# Patient Record
Sex: FEMALE | Race: WHITE | NOT HISPANIC OR LATINO | Employment: OTHER | URBAN - METROPOLITAN AREA
[De-identification: names, ages, dates, MRNs, and addresses within clinical notes are randomized per-mention and may not be internally consistent; named-entity substitution may affect disease eponyms.]

---

## 2023-03-13 ENCOUNTER — TELEPHONE (OUTPATIENT)
Dept: PRIMARY CARE | Facility: CLINIC | Age: 83
End: 2023-03-13
Payer: MEDICARE

## 2023-03-13 NOTE — TELEPHONE ENCOUNTER
Rosie from [employer] is calling to inform you that pt was sent to [facility] for severe lower back pain.

## 2023-03-14 NOTE — TELEPHONE ENCOUNTER
[facility] coordinator, left a msg stating that the pt will be sent home with care orders and wanted to know if you will follow.

## 2023-03-16 ENCOUNTER — TELEPHONE (OUTPATIENT)
Dept: PRIMARY CARE | Facility: CLINIC | Age: 83
End: 2023-03-16
Payer: MEDICARE

## 2023-03-16 NOTE — TELEPHONE ENCOUNTER
Care Giver Ethel from [facility] called regarding wanting her pt. To start Physical Therapy today and is requesting order please. Ethel states stop care with [facility] as of today.Please Advise.

## 2023-03-17 ENCOUNTER — TELEPHONE (OUTPATIENT)
Dept: PRIMARY CARE | Facility: CLINIC | Age: 83
End: 2023-03-17
Payer: MEDICARE

## 2023-03-27 PROBLEM — M46.1 SACROILIITIS (CMS-HCC): Status: ACTIVE | Noted: 2023-03-27

## 2023-03-27 PROBLEM — M81.0 AGE RELATED OSTEOPOROSIS: Status: ACTIVE | Noted: 2023-03-27

## 2023-03-27 PROBLEM — J38.3 VOCAL CORD DISEASE: Status: ACTIVE | Noted: 2023-03-27

## 2023-03-27 PROBLEM — N18.2 CHRONIC KIDNEY DISEASE (CKD), STAGE II (MILD): Status: ACTIVE | Noted: 2023-03-27

## 2023-03-27 PROBLEM — M54.50 LOWER BACK PAIN: Status: ACTIVE | Noted: 2023-03-27

## 2023-03-27 PROBLEM — R41.3 MEMORY LOSS: Status: ACTIVE | Noted: 2023-03-27

## 2023-03-27 PROBLEM — M48.061 LUMBAR SPINAL STENOSIS: Status: ACTIVE | Noted: 2023-03-27

## 2023-03-27 PROBLEM — M35.00 SJOGRENS SYNDROME (MULTI): Status: ACTIVE | Noted: 2023-03-27

## 2023-03-27 PROBLEM — K63.5 COLON POLYPS: Status: ACTIVE | Noted: 2023-03-27

## 2023-03-27 PROBLEM — I87.8 VENOUS STASIS: Status: ACTIVE | Noted: 2023-03-27

## 2023-03-27 PROBLEM — M54.2 NECK PAIN: Status: ACTIVE | Noted: 2023-03-27

## 2023-03-27 PROBLEM — H90.3 ASYMMETRICAL SENSORINEURAL HEARING LOSS: Status: ACTIVE | Noted: 2023-03-27

## 2023-03-27 PROBLEM — I10 TYPE 2 MYOCARDIAL INFARCTION DUE TO HYPERTENSION (MULTI): Status: ACTIVE | Noted: 2023-03-27

## 2023-03-27 PROBLEM — Z20.822 SUSPECTED COVID-19 VIRUS INFECTION: Status: ACTIVE | Noted: 2023-03-27

## 2023-03-27 PROBLEM — F32.A DEPRESSION: Status: ACTIVE | Noted: 2023-03-27

## 2023-03-27 PROBLEM — S00.06XA: Status: ACTIVE | Noted: 2023-03-27

## 2023-03-27 PROBLEM — I21.9 MYOCARDIAL INFARCTION (MULTI): Status: ACTIVE | Noted: 2023-03-27

## 2023-03-27 PROBLEM — F33.9 RECURRENT MAJOR DEPRESSIVE DISORDER (CMS-HCC): Status: ACTIVE | Noted: 2023-03-27

## 2023-03-27 PROBLEM — I73.9 CLAUDICATION, INTERMITTENT (CMS-HCC): Status: ACTIVE | Noted: 2023-03-27

## 2023-03-27 PROBLEM — R26.89 IMBALANCE: Status: ACTIVE | Noted: 2023-03-27

## 2023-03-27 PROBLEM — F03.90 DEMENTIA WITHOUT BEHAVIORAL DISTURBANCE, PSYCHOTIC DISTURBANCE, MOOD DISTURBANCE, OR ANXIETY (MULTI): Status: ACTIVE | Noted: 2023-03-27

## 2023-03-27 PROBLEM — H93.11 TINNITUS, RIGHT EAR: Status: ACTIVE | Noted: 2023-03-27

## 2023-03-27 PROBLEM — M25.561 RIGHT KNEE PAIN: Status: ACTIVE | Noted: 2023-03-27

## 2023-03-27 PROBLEM — M25.512 BILATERAL SHOULDER PAIN: Status: ACTIVE | Noted: 2023-03-27

## 2023-03-27 PROBLEM — D33.3 RIGHT ACOUSTIC NEUROMA (MULTI): Status: ACTIVE | Noted: 2023-03-27

## 2023-03-27 PROBLEM — G47.00 INSOMNIA: Status: ACTIVE | Noted: 2023-03-27

## 2023-03-27 PROBLEM — M62.81 MUSCLE WEAKNESS OF LOWER EXTREMITY: Status: ACTIVE | Noted: 2023-03-27

## 2023-03-27 PROBLEM — R05.9 COUGH: Status: ACTIVE | Noted: 2023-03-27

## 2023-03-27 PROBLEM — U07.1 DISEASE DUE TO SEVERE ACUTE RESPIRATORY SYNDROME CORONAVIRUS 2 (SARS-COV-2): Status: ACTIVE | Noted: 2023-03-27

## 2023-03-27 PROBLEM — N18.31 STAGE 3A CHRONIC KIDNEY DISEASE (MULTI): Status: ACTIVE | Noted: 2023-03-27

## 2023-03-27 PROBLEM — I21.A1 TYPE 2 MYOCARDIAL INFARCTION DUE TO HYPERTENSION (MULTI): Status: ACTIVE | Noted: 2023-03-27

## 2023-03-27 PROBLEM — M25.511 BILATERAL SHOULDER PAIN: Status: ACTIVE | Noted: 2023-03-27

## 2023-03-27 PROBLEM — M19.019 LOCALIZED, PRIMARY OSTEOARTHRITIS OF SHOULDER REGION: Status: ACTIVE | Noted: 2023-03-27

## 2023-03-27 PROBLEM — E03.9 HYPOTHYROIDISM: Status: ACTIVE | Noted: 2023-03-27

## 2023-03-27 PROBLEM — M79.89 SWOLLEN ARM: Status: ACTIVE | Noted: 2023-03-27

## 2023-03-27 PROBLEM — E55.9 VITAMIN D DEFICIENCY: Status: ACTIVE | Noted: 2023-03-27

## 2023-03-27 PROBLEM — M19.011 LOCALIZED OSTEOARTHRITIS OF RIGHT SHOULDER: Status: ACTIVE | Noted: 2023-03-27

## 2023-03-27 PROBLEM — R94.31 ABNORMAL EKG: Status: ACTIVE | Noted: 2023-03-27

## 2023-03-27 PROBLEM — N18.1 CHRONIC KIDNEY DISEASE (CKD), STAGE I: Status: ACTIVE | Noted: 2023-03-27

## 2023-03-27 PROBLEM — R51.9 HEADACHE: Status: ACTIVE | Noted: 2023-03-27

## 2023-03-27 PROBLEM — K92.1 HEMATOCHEZIA: Status: ACTIVE | Noted: 2023-03-27

## 2023-03-27 PROBLEM — I10 BENIGN ESSENTIAL HYPERTENSION: Status: ACTIVE | Noted: 2023-03-27

## 2023-03-27 PROBLEM — M54.50 LUMBAGO: Status: ACTIVE | Noted: 2023-03-27

## 2023-03-27 PROBLEM — M85.80 OSTEOPENIA: Status: ACTIVE | Noted: 2023-03-27

## 2023-03-27 PROBLEM — G47.33 OBSTRUCTIVE SLEEP APNEA: Status: ACTIVE | Noted: 2023-03-27

## 2023-03-27 PROBLEM — R41.3 MILD MEMORY DISTURBANCE: Status: ACTIVE | Noted: 2023-03-27

## 2023-03-27 PROBLEM — S05.02XA LEFT CORNEAL ABRASION: Status: ACTIVE | Noted: 2023-03-27
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
## 2023-03-27 RX ORDER — CALCIUM CARBONATE 200(500)MG
1 TABLET,CHEWABLE ORAL DAILY
COMMUNITY
Start: 2021-04-22

## 2023-03-27 RX ORDER — DENOSUMAB 60 MG/ML
60 INJECTION SUBCUTANEOUS
COMMUNITY
Start: 2020-07-01

## 2023-03-27 RX ORDER — MEMANTINE HYDROCHLORIDE 10 MG/1
1 TABLET ORAL 2 TIMES DAILY
COMMUNITY
Start: 2022-06-28

## 2023-03-27 RX ORDER — ASPIRIN 325 MG
1 TABLET, DELAYED RELEASE (ENTERIC COATED) ORAL
COMMUNITY
Start: 2022-04-29

## 2023-03-27 RX ORDER — CHOLECALCIFEROL (VITAMIN D3) 25 MCG
TABLET ORAL
COMMUNITY
End: 2023-09-28 | Stop reason: SDUPTHER

## 2023-03-27 RX ORDER — ACETAMINOPHEN 500 MG
1-2 TABLET ORAL EVERY 8 HOURS PRN
COMMUNITY

## 2023-03-27 RX ORDER — ASPIRIN 81 MG/1
1 TABLET ORAL DAILY
COMMUNITY
Start: 2022-10-10 | End: 2023-09-28 | Stop reason: ALTCHOICE

## 2023-03-27 RX ORDER — HYDROXYCHLOROQUINE SULFATE 200 MG/1
1 TABLET, FILM COATED ORAL 2 TIMES DAILY
COMMUNITY
Start: 2013-01-25

## 2023-03-27 RX ORDER — PAROXETINE 30 MG/1
TABLET, FILM COATED ORAL
COMMUNITY
Start: 2022-08-02 | End: 2023-12-21 | Stop reason: ALTCHOICE

## 2023-03-27 RX ORDER — ATORVASTATIN CALCIUM 40 MG/1
1 TABLET, FILM COATED ORAL NIGHTLY
COMMUNITY
Start: 2022-10-06

## 2023-03-27 RX ORDER — CYCLOSPORINE 0.5 MG/ML
1 EMULSION OPHTHALMIC NIGHTLY
COMMUNITY
Start: 2012-12-18 | End: 2024-01-30 | Stop reason: WASHOUT

## 2023-03-27 RX ORDER — PAROXETINE HYDROCHLORIDE 20 MG/1
1 TABLET, FILM COATED ORAL DAILY
COMMUNITY
Start: 2016-04-05 | End: 2023-09-28

## 2023-03-27 RX ORDER — AMLODIPINE AND BENAZEPRIL HYDROCHLORIDE 5; 10 MG/1; MG/1
1 CAPSULE ORAL DAILY
COMMUNITY
Start: 2013-01-17 | End: 2023-09-28 | Stop reason: ALTCHOICE

## 2023-03-27 RX ORDER — LEVOTHYROXINE SODIUM 150 UG/1
1 TABLET ORAL DAILY
COMMUNITY
Start: 2021-02-24

## 2023-03-27 RX ORDER — PREDNISONE 5 MG/1
1 TABLET ORAL DAILY
COMMUNITY
Start: 2019-04-11

## 2023-03-27 NOTE — TELEPHONE ENCOUNTER
I spoke with Isabela and she said she had been in the hospital and they had given her Tramadol 50mg,  and she came back to the facility with 5 days worth, PRN every 6 hrs.  They are asking for a refill of this.

## 2023-03-28 NOTE — TELEPHONE ENCOUNTER
Amy and I called [facility] and spoke to the her nurse Isabela. She said she has been giving her Tylenol 1,000 ml twice a day. She said she is in the most pain in the morning. She is able to get up on her own and sometimes she uses her walker and sometimes the wheelchair. They said at the hospital she has bulging discs. They do not recommend any surgery. She is doing PT but no other follow up was given. Her daughter Lina said she would be able to bring her into the office but it would have to be later in the day next week if you would like to see her in the office to evaluate. Please advise and I will call her daughter and schedule the appointment. Amy said to ask you if you would consider tylenol 1,000 ml 3 x  day in the meantime. Please advise on that as well.

## 2023-03-29 ENCOUNTER — APPOINTMENT (OUTPATIENT)
Dept: PRIMARY CARE | Facility: CLINIC | Age: 83
End: 2023-03-29
Payer: MEDICARE

## 2023-04-05 ENCOUNTER — APPOINTMENT (OUTPATIENT)
Dept: PRIMARY CARE | Facility: CLINIC | Age: 83
End: 2023-04-05
Payer: MEDICARE

## 2023-07-12 ENCOUNTER — TELEMEDICINE (OUTPATIENT)
Dept: PRIMARY CARE | Facility: CLINIC | Age: 83
End: 2023-07-12
Payer: MEDICARE

## 2023-07-12 DIAGNOSIS — M54.31 BILATERAL SCIATICA: Primary | ICD-10-CM

## 2023-07-12 DIAGNOSIS — M54.32 BILATERAL SCIATICA: Primary | ICD-10-CM

## 2023-07-12 DIAGNOSIS — F33.42 RECURRENT MAJOR DEPRESSIVE DISORDER, IN FULL REMISSION (CMS-HCC): ICD-10-CM

## 2023-07-12 DIAGNOSIS — N18.31 STAGE 3A CHRONIC KIDNEY DISEASE (MULTI): ICD-10-CM

## 2023-07-12 DIAGNOSIS — I73.9 CLAUDICATION, INTERMITTENT (CMS-HCC): ICD-10-CM

## 2023-07-12 DIAGNOSIS — D33.3 RIGHT ACOUSTIC NEUROMA (MULTI): ICD-10-CM

## 2023-07-12 PROCEDURE — 99213 OFFICE O/P EST LOW 20 MIN: CPT | Performed by: INTERNAL MEDICINE

## 2023-07-12 RX ORDER — HYDROCODONE BITARTRATE AND ACETAMINOPHEN 5; 325 MG/1; MG/1
1 TABLET ORAL 2 TIMES DAILY
Qty: 60 TABLET | Refills: 0 | Status: SHIPPED | OUTPATIENT
Start: 2023-07-12 | End: 2023-08-11

## 2023-07-12 ASSESSMENT — ENCOUNTER SYMPTOMS
BACK PAIN: 1
LEG PAIN: 1
WEIGHT LOSS: 0
FEVER: 0
DYSURIA: 0
HEADACHES: 1
PARESIS: 0
BOWEL INCONTINENCE: 0
NUMBNESS: 1
PERIANAL NUMBNESS: 0
PARESTHESIAS: 1
TINGLING: 1
ABDOMINAL PAIN: 0

## 2023-07-12 NOTE — PROGRESS NOTES
Subjective   Patient ID: Sabina Pal is a 82 y.o. female who presents for No chief complaint on file..    Back Pain  This is a chronic problem. The current episode started more than 1 month ago. The problem occurs constantly. The problem has been rapidly worsening since onset. The pain is present in the sacro-iliac. The quality of the pain is described as aching, burning, shooting and stabbing. The pain radiates to the right foot, right knee and right thigh. The pain is at a severity of 10/10. The pain is The same all the time. The symptoms are aggravated by bending, coughing, position, lying down, sitting, standing, stress and twisting. Stiffness is present In the morning and all day. Associated symptoms include headaches, leg pain, numbness, paresthesias and tingling. Pertinent negatives include no abdominal pain, bladder incontinence, bowel incontinence, chest pain, dysuria, fever, paresis, pelvic pain, perianal numbness or weight loss. Risk factors include sedentary lifestyle.    Ongoing issues with significant low back pain and sciatica.  Status post recent hospitalization.  Initially treated with gabapentin and tramadol.  About 2 weeks ago pain was quite intense, limiting.  Unable to function.  Gabapentin increased and transition from tramadol to [place] with relief.  Doing well over the past week.  Tolerating treatment without side effects.  No constipation.  No falls dizziness fatigue confusion.  Living in assisted living facility currently.  Has medicines administered by staff.    On gabapentin 300 tid and Norco 5/325 every day-BID and tylenol 1000 tid  Review of Systems   Constitutional:  Negative for fever and weight loss.   Cardiovascular:  Negative for chest pain.   Gastrointestinal:  Negative for abdominal pain and bowel incontinence.   Genitourinary:  Negative for bladder incontinence, dysuria and pelvic pain.   Musculoskeletal:  Positive for back pain.   Neurological:  Positive for tingling,  numbness, headaches and paresthesias.   All systems reviewed and negative except as per history of present illness  Objective   There were no vitals taken for this visit.    Physical Exam      3/12/13;     MR lumbar spine w and wo IV contrast  Impression    Post surgical changes are noted at L3-L5 with multiple laminectomies and fusion with posterior plates and pedicle screws.  Multilevel foraminal encroachment is noted with nerve root impingement bilaterally at T12-L1 and L1-L2 and on the right at L5-S1.    At the L1-L2 level there is a generalized disc bulge and focal right paracentral disc protrusion with resultant central canal stenosis.  This is best demonstrated on the axial and sagittal T2 images.    Assessment/Plan     Severe, limiting low back pain.  Spent significant time talking with patient and 2 daughters.  Reviewed risks of these medicines at great length.  Reviewed addiction and abuse potential.  At this point, feel pain relief is worth risks.  Orders completed.  Reviewed.  Patient is living in facility.  Will consult pain medicine.  Continue current treatment for now.  Consider trying to reduce Norco and perhaps increase gabapentin over the next few visits.

## 2023-07-13 ASSESSMENT — ENCOUNTER SYMPTOMS
NUMBNESS: 1
FEVER: 0
WEIGHT LOSS: 0
LEG PAIN: 1
ABDOMINAL PAIN: 0
BACK PAIN: 1
BOWEL INCONTINENCE: 0
PERIANAL NUMBNESS: 0
PARESIS: 0
DYSURIA: 0
HEADACHES: 1
PARESTHESIAS: 1
TINGLING: 1

## 2023-07-14 ENCOUNTER — TELEPHONE (OUTPATIENT)
Dept: PRIMARY CARE | Facility: CLINIC | Age: 83
End: 2023-07-14
Payer: MEDICARE

## 2023-07-14 NOTE — TELEPHONE ENCOUNTER
[facility] to the [facility] who states this is from the lab [facility] that the electronic signature is needed, this will allow Dr Lock to order labs for her there and results will be faxed to us, she is going to fax the paperwork needed and Dr Lock will look it over first.

## 2023-07-14 NOTE — TELEPHONE ENCOUNTER
"----- Message from Belén Lock MD sent at 7/14/2023  8:18 AM EDT -----  Regarding: FW: Sabina Pal  Contact: [phone]  Ok, but I don't want another electronic system to connect to  ----- Message -----  From: Harper Olivarez CMA  Sent: 7/14/2023   6:49 AM EDT  To: Belén Lock MD  Subject: FW: Sabina Pal                                    ----- Message -----  From: Sabina Pal  Sent: 7/13/2023   6:28 PM EDT  To:  [place] [address] Clinical Support Staff  Subject: Sabina Pal                                      The Health Services Director from [facility] sent the following to me: \"The pharmacy rep from [facility] called Dr. Lock's office to set him up with an electronic signature and his staff was adamant that he does not see anyone who lives at [facility].\"   Can you confirm with your staff that Sabina is your patient.  Thank you  Bianca Da Silva      "

## 2023-07-14 NOTE — TELEPHONE ENCOUNTER
"----- Message from Belén Lock MD sent at 7/14/2023  8:18 AM EDT -----  Regarding: FW: Sabina Pal  Contact: [phone]  Ok, but I don't want another electronic system to connect to  ----- Message -----  From: Harper Olivarez CMA  Sent: 7/14/2023   6:49 AM EDT  To: Belén Lock MD  Subject: FW: Sabina Pal                                    ----- Message -----  From: Sabina Pal  Sent: 7/13/2023   6:28 PM EDT  To:  [place] [id number] Clinical Support Staff  Subject: Sabina Pal                                      The Health Services Director from [facility] sent the following to me: \"The pharmacy rep from [facility] called Dr. Lock's office to set him up with an electronic signature and his staff was adamant that he does not see anyone who lives at [facility].\"   Can you confirm with your staff that Sabina is your patient.  Thank you  Bianca Da Silva      "

## 2023-07-14 NOTE — TELEPHONE ENCOUNTER
I have printed the email regarding this, and will wait for his response on how he would like to proceed

## 2023-07-27 ENCOUNTER — APPOINTMENT (OUTPATIENT)
Dept: PRIMARY CARE | Facility: CLINIC | Age: 83
End: 2023-07-27
Payer: MEDICARE

## 2023-07-31 ENCOUNTER — PATIENT OUTREACH (OUTPATIENT)
Dept: PRIMARY CARE | Facility: CLINIC | Age: 83
End: 2023-07-31
Payer: MEDICARE

## 2023-07-31 ENCOUNTER — TELEPHONE (OUTPATIENT)
Dept: PRIMARY CARE | Facility: CLINIC | Age: 83
End: 2023-07-31
Payer: MEDICARE

## 2023-07-31 RX ORDER — POLYVINYL ALCOHOL 14 MG/ML
1 SOLUTION/ DROPS OPHTHALMIC 4 TIMES DAILY
COMMUNITY
Start: 2023-05-31 | End: 2024-01-02 | Stop reason: SDUPTHER

## 2023-07-31 RX ORDER — ACETAMINOPHEN 500 MG
5 TABLET ORAL NIGHTLY
COMMUNITY
End: 2024-02-22 | Stop reason: ALTCHOICE

## 2023-07-31 RX ORDER — GUAIFENESIN 600 MG/1
1200 TABLET, EXTENDED RELEASE ORAL 2 TIMES DAILY
COMMUNITY

## 2023-07-31 NOTE — TELEPHONE ENCOUNTER
Ethel King PT with [facility] says PT just returned from the hospital  for SOB and pneumonia with PT order.  She did resumption of care yesterday and they are going to be seeing her 1 time a week for 4 weeks and then 1 time a week for 3 weeks - please let know if this is ok

## 2023-07-31 NOTE — PROGRESS NOTES
Discharge Facility: [facility]   Discharge Diagnosis: Shortness of breath (Primary Dx);   Hypoxia;   Acute cough;   Pneumonia due to infectious organism, unspecified laterality, unspecified part of lung;   Acute pulmonary edema (HCC)   Admission Date: 7-23-23   Discharge Date: 7-28-23    PCP Appointment Date:   TCM complete. Patient is NOT scheduled for a hospital follow up due to no available appointments, task to office.  Patient discharged (7-28-23).  In order to bill as a TCM, the patient needs to be seen by (8-11-23).    Moderately complex & follow-up within 14 days- bill 94999 for hospital follow up.   Highly complex and follow-up visit within 7 days-can bill 13108 for hospital follow up    *virtual follow up needs modifier added (95 or GT)   *AWV AND TCM CAN BE BILLED TOGETHER WITH 25 MODIFIER    Specialist Appointment Date:   Hospital Encounter and Summary:  not available at this time   See discharge assessment below for further details  Engagement  Call Start Time: 1426 (7/31/2023  2:35 PM)    Medications  Medications reviewed with patient/caregiver?: Yes (7/31/2023  2:35 PM)  Is the patient having any side effects they believe may be caused by any medication additions or changes?: No (7/31/2023  2:35 PM)  Does the patient have all medications ordered at discharge?: Yes (7/31/2023  2:35 PM)  Care Management Interventions: No intervention needed (7/31/2023  2:35 PM)  Is the patient taking all medications as directed (includes completed medication regime)?: Yes (7/31/2023  2:35 PM)  Care Management Interventions: Provided patient education (7/31/2023  2:35 PM)    Appointments  Does the patient have a primary care provider?: Yes (7/31/2023  2:35 PM)  Care Management Interventions: Educated patient on importance of making appointment (7/31/2023  2:35 PM)    Self Management  What is the home health agency?: Pt. lives at Assisted living facility (7/31/2023  2:35 PM)    Patient Teaching  Does the patient  have access to their discharge instructions?: Yes (7/31/2023  2:35 PM)  Care Management Interventions: Reviewed instructions with patient (7/31/2023  2:35 PM)  What is the patient's perception of their health status since discharge?: Improving (7/31/2023  2:35 PM)  Is the patient/caregiver able to teach back the hierarchy of who to call/visit for symptoms/problems? PCP, Specialist, Home Health nurse, Urgent Care, ED, 911: Yes (7/31/2023  2:35 PM)      Kana Dale LPN

## 2023-08-02 ENCOUNTER — APPOINTMENT (OUTPATIENT)
Dept: PRIMARY CARE | Facility: CLINIC | Age: 83
End: 2023-08-02
Payer: MEDICARE

## 2023-08-04 ENCOUNTER — APPOINTMENT (OUTPATIENT)
Dept: PRIMARY CARE | Facility: CLINIC | Age: 83
End: 2023-08-04
Payer: MEDICARE

## 2023-08-04 ENCOUNTER — TELEMEDICINE (OUTPATIENT)
Dept: PRIMARY CARE | Facility: CLINIC | Age: 83
End: 2023-08-04
Payer: MEDICARE

## 2023-08-04 DIAGNOSIS — M54.50 LOW BACK PAIN, UNSPECIFIED BACK PAIN LATERALITY, UNSPECIFIED CHRONICITY, UNSPECIFIED WHETHER SCIATICA PRESENT: ICD-10-CM

## 2023-08-04 DIAGNOSIS — M62.81 MUSCLE WEAKNESS OF LOWER EXTREMITY: ICD-10-CM

## 2023-08-04 DIAGNOSIS — M46.1 SACROILIITIS (CMS-HCC): Primary | ICD-10-CM

## 2023-08-04 PROCEDURE — 99213 OFFICE O/P EST LOW 20 MIN: CPT | Performed by: INTERNAL MEDICINE

## 2023-08-04 NOTE — PROGRESS NOTES
Subjective   Patient ID: Sabina Olmos is a 82 y.o. female who presents for No chief complaint on file..    HPI   Discharge note 2023 reviewed.  Presented with hypoxia and fatigue.  Noted with pneumonia.  Treated with antibiotics with complete resolution.  Overall doing well.  Back pain controlled.  However, difficulty getting out of a chair.  Working with pulmonology at [facility].  Follow-up x-ray pending.  No recent falls.  Otherwise patient feels quite well.  Review of Systems  All systems reviewed and negative except as per history of present illness  Objective   There were no vitals taken for this visit.    Physical Exam      Cardiomegaly. Question asymmetric pulmonary edema versus infiltrate of the right lung.        Report Dictated on Workstation: [id number]   Electronically Signed By: Joo Porter MD   Electronically Signed Date/Time: 2023 2:08 PM EDT  Narrative    Patient Name: SABINA OLMOS  : 1940  MRN: [id number]  Acct#: [id number]  Accession: [id number]  Exam Date/Time: 2023 13:52  Procedure: XR CHEST 2 VIEWS  Ordering Provider: LEOS DOUGLAS  Reason For Exam: hypoxia, unilateral abnormal lung sounds.  PNA?      EXAMINATION:  PA/lateral chest    INDICATION:  hypoxia, unilateral abnormal lung sounds.  PNA?    FINDINGS:  Large patient body habitus slightly limits assessment. Mild to moderate diffuse interstitial prominence of the lungs is greater on the right, possibly in part technical.    There is otherwise no focal consolidation, sizable pleural effusion or  pneumothorax.   Mild elevation of right hemidiaphragm is present.    The cardiac silhouette is enlarged. There is calcification of the aortic arch.    Small osteophytes of the spine are present at multiple levels. There is a total left shoulder arthroplasty. Degenerative changes of the right shoulder are also present.  Assessment/Plan     #1 vocal cord lesion-resolved, remote issue. Followup ear nose and throat prn  #2  shoulder pain- stable/improved, continue physical therapy. f/u ortho/rheum. voltarin gel.   #3 hypothyroidism-stable followup with endocrinology(dr mascorro)  #4 elevated creatinine- stable/subtle. suspect she has stage 3 chronic kidney disease. will con't to follow. Order to patient for labs  #5 bronchiectasis- stable on low- dose Advair. Followup pulmonology. con't advair.   #6 osteopenia- stable. Followup rheumatology- con't Rx.  #7 hypertension- good. Continue treatment. Stay focused on low salt diet with exercise  #8 obstructive sleep apnea- refuses treating. Reviewed Ent EVAL.  SHE DECLINES RX.   #9 degenerative joint disease/inflammatory arthritis/sjogren's/sacroiliitis- followup rheumatology. low-dose prednisone.   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 f/u ENT.  #11 anxiety- good, con't. Very supportive family. Encouraged grief group. con't paroxetine to 20 mg. Consider reducing dose next vsit. Reviewed use and side effects  #12 palps- resolved  #13 imbalance- reviewed. Discussed safety issues. . use walker. f/u ENT. con't to use walker.  #14 insomnia- better.   #15 back pain- better  #16 memory yjbsko-uhdhla-ot geriatrics. Reviewed options at length. Recommend beginning Aricept. Discussed concern from pharmacist over QTC prolongation. Per this EMRs drug interaction calculator as well as up-to-date's interaction calculator there is no interaction with those medicines. However, after long discussion with family and patient they have opted to hold off on treatment for now. We discussed strategy of monitoring EKG as we add rx, they will consider. will increase memotine to 10 bid.   #17 hypothyroidism-unclear if patient is taking regularly. Reviewed with family. Reviewed taking on an empty stomach. Family will work on this and will retest in 6 weeks, adjust dose pending  #18 imbalance- better, s/p PT   #19 recent fall-continue PT. No sign of trauma  #20 possible type II MI-status post cardiology evaluation.  Patient stress test normal. No recommendations made. I do not have these results. We will try and obtain.  #21 LBP- better.  Still w/ difficulty getting out of a chair.  Working w/ PT.  Rec Lift-chair to asist w/ ADLS.  Con't norco every day.  Reviewed risks.   #22 pna- DOING WELL. f/u  Pulm at [facility].      d/w pt DNR issues. She will discuss with her family.  f/u 4 mths  mammo--> up-to-date in chart

## 2023-08-09 ENCOUNTER — TELEPHONE (OUTPATIENT)
Dept: PRIMARY CARE | Facility: CLINIC | Age: 83
End: 2023-08-09
Payer: MEDICARE

## 2023-08-09 DIAGNOSIS — M25.512 CHRONIC PAIN OF BOTH SHOULDERS: ICD-10-CM

## 2023-08-09 DIAGNOSIS — G89.29 CHRONIC BILATERAL LOW BACK PAIN, UNSPECIFIED WHETHER SCIATICA PRESENT: ICD-10-CM

## 2023-08-09 DIAGNOSIS — G89.29 CHRONIC PAIN OF BOTH SHOULDERS: ICD-10-CM

## 2023-08-09 DIAGNOSIS — M75.101 ROTATOR CUFF SYNDROME OF RIGHT SHOULDER: ICD-10-CM

## 2023-08-09 DIAGNOSIS — M54.50 CHRONIC BILATERAL LOW BACK PAIN, UNSPECIFIED WHETHER SCIATICA PRESENT: ICD-10-CM

## 2023-08-09 DIAGNOSIS — M62.81 MUSCLE WEAKNESS OF LOWER EXTREMITY: ICD-10-CM

## 2023-08-09 DIAGNOSIS — M54.32 BILATERAL SCIATICA: ICD-10-CM

## 2023-08-09 DIAGNOSIS — M54.31 BILATERAL SCIATICA: ICD-10-CM

## 2023-08-09 DIAGNOSIS — M81.0 AGE RELATED OSTEOPOROSIS, UNSPECIFIED PATHOLOGICAL FRACTURE PRESENCE: ICD-10-CM

## 2023-08-09 DIAGNOSIS — M25.511 CHRONIC PAIN OF BOTH SHOULDERS: ICD-10-CM

## 2023-08-09 NOTE — TELEPHONE ENCOUNTER
Ethel, PT with [facility], let a msg stating that the pt's family is looking for a lift chair.  She said that some possible codes that might be used are M190.11 Osteoporosis of Right Shoulder, Maybe M 75.101 Tendonitis rotator cuffs, and she has limited ROM shoulders both sides.

## 2023-08-09 NOTE — TELEPHONE ENCOUNTER
Order placed, I also called and left message for Ethel as to where we should send the order, and to please call back

## 2023-08-15 ENCOUNTER — TELEPHONE (OUTPATIENT)
Dept: PRIMARY CARE | Facility: CLINIC | Age: 83
End: 2023-08-15
Payer: MEDICARE

## 2023-08-15 NOTE — TELEPHONE ENCOUNTER
Brittany Nguyen, charge nurse at [facility], left  an FYI to let you know the pt was sent to [facility] this morning for SOB and a feeling of a lump in her throat.  Any questions, call [phone]

## 2023-08-21 ENCOUNTER — TELEPHONE (OUTPATIENT)
Dept: PRIMARY CARE | Facility: CLINIC | Age: 83
End: 2023-08-21
Payer: MEDICARE

## 2023-08-21 ENCOUNTER — PATIENT OUTREACH (OUTPATIENT)
Dept: PRIMARY CARE | Facility: CLINIC | Age: 83
End: 2023-08-21
Payer: MEDICARE

## 2023-08-21 NOTE — PROGRESS NOTES
Discharge Facility: [facility]   Discharge Diagnosis: Hypoxia (Primary Dx);   Dysphagia, unspecified type   Admission Date:  8-15-23  Discharge Date: 8-19-23    PCP Appointment Date: 8-30-23    TCM call completed (8-21-23)  Patient is scheduled within 7-14 days of discharge on (8-30-23) for hospital follow up, however was unable to reach patient during two business days after discharge despite at least two attempts made.    If patient meets criteria for moderately complex & has follow-up within 14 days-can bill 23949 for hospital follow up.   If patient meets criteria for highly complex & has follow-up visit within 7 days-can bill [id number] for hospital follow up    * Virtual follow up needs modifier added (95 or GT)  AWV AND TCM CAN BE BILLED TOGETHER WITH 25 MODIFIER  Kana Dale LPN

## 2023-08-21 NOTE — TELEPHONE ENCOUNTER
Fernando Davenport, PT with [facility], left a msg stating that this pt was recently discharged from the hospital, and she is looking to resume the pt's PT.  It will be 2x/wk for 3 wks, then 1x/wk for 1wk.  Please call if this is acceptable.   Ph: [phone]

## 2023-08-24 ENCOUNTER — TELEPHONE (OUTPATIENT)
Dept: PRIMARY CARE | Facility: CLINIC | Age: 83
End: 2023-08-24
Payer: MEDICARE

## 2023-08-24 NOTE — TELEPHONE ENCOUNTER
Livier, [facility] care equipment, left a msg wanting a  face to face with this pt, discussing her back issues and that you discussed her getting a lift chair. She is asking if you can amend your Telemedicine visit from 7/12/2023, as you were thorough with her back issues, and that you mentioned a lift chair.  PLEASE.  TY.  She needs this ASAP.

## 2023-08-30 ENCOUNTER — TELEMEDICINE (OUTPATIENT)
Dept: PRIMARY CARE | Facility: CLINIC | Age: 83
End: 2023-08-30
Payer: MEDICARE

## 2023-08-30 DIAGNOSIS — B37.81 CANDIDAL ESOPHAGITIS (MULTI): Primary | ICD-10-CM

## 2023-08-30 PROCEDURE — 99495 TRANSJ CARE MGMT MOD F2F 14D: CPT | Performed by: INTERNAL MEDICINE

## 2023-08-30 NOTE — PROGRESS NOTES
Subjective   Patient ID: Sabina Pal is a 82 y.o. female who presents for No chief complaint on file..    HPI   Discharge note 8/21/2023 reviewed.  Presented with dysphagia.  CT scan, lab works unrevealing.  Underwent endoscopy.  Endoscopy report dated 8/23/2023 reviewed.  Ultimately diagnosed with candidal esophagitis.  Placed on fluconazole.  Remarkably improved.  At this point no dysphagia, no heartburn.  Doing quite well otherwise.  Review of Systems  All systems reviewed and negative except as per history of present illness  Objective   There were no vitals taken for this visit.    Physical Exam          Assessment/Plan     #1 vocal cord lesion-resolved, remote issue. Followup ear nose and throat prn  #2 shoulder pain- stable/improved, continue physical therapy. f/u ortho/rheum. voltarin gel.   #3 hypothyroidism-stable followup with endocrinology(dr mascorro)  #4 elevated creatinine- stable/subtle. suspect she has stage 3 chronic kidney disease. will con't to follow. Order to patient for labs  #5 bronchiectasis- stable on low- dose Advair. Followup pulmonology. con't advair.   #6 osteopenia- stable. Followup rheumatology- con't Rx.  #7 hypertension- good. Continue treatment. Stay focused on low salt diet with exercise  #8 obstructive sleep apnea- refuses treating. Reviewed Ent EVAL.  SHE DECLINES RX.   #9 degenerative joint disease/inflammatory arthritis/sjogren's/sacroiliitis- followup rheumatology. low-dose prednisone.   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 f/u ENT.  #11 anxiety- good, con't. Very supportive family. Encouraged grief group. con't paroxetine to 20 mg. Consider reducing dose next vsit. Reviewed use and side effects  #12 palps- resolved  #13 imbalance- reviewed. Discussed safety issues. . use walker. f/u ENT. con't to use walker.  #14 insomnia- better.   #15 back pain- better, appt pending w/ pain med   #16 memory [id number] geriatrics. Reviewed options at length. Recommend beginning  Aricept. Discussed concern from pharmacist over QTC prolongation. Per this EMRs drug interaction calculator as well as up-to-date's interaction calculator there is no interaction with those medicines. However, after long discussion with family and patient they have opted to hold off on treatment for now. We discussed strategy of monitoring EKG as we add rx, they will consider. will increase memotine to 10 bid.   #17 hypothyroidism-unclear if patient is taking regularly. Reviewed with family. Reviewed taking on an empty stomach. Family will work on this and will retest in 6 weeks, adjust dose pending  #18 imbalance- better, s/p PT   #19 recent fall-continue PT. No sign of trauma  #20 possible type II MI-status post cardiology evaluation. Patient stress test normal. No recommendations made. I do not have these results. We will try and obtain.  #21 LBP- better.  Still w/ difficulty getting out of a chair.  Working w/ PT.  Rec Lift-chair to asist w/ ADLS.  Con't norco every day.  Reviewed risks.   #22 pna- DOING WELL. f/u  Pulm at [facility] 9/12/23.   #23 candidal esophagitis- on rx, f/u  GI at [facility]     d/w pt DNR issues. She will discuss with her family.  f/u 4 mths  mammo--> up-to-date in chart

## 2023-09-05 ENCOUNTER — PATIENT OUTREACH (OUTPATIENT)
Dept: PRIMARY CARE | Facility: CLINIC | Age: 83
End: 2023-09-05
Payer: MEDICARE

## 2023-09-05 NOTE — PROGRESS NOTES
Pt. Fell on Saturday in the bathroom and hit her head. She went into the ED, and they were going to discharge home but felt weak when walking. Pt. Now acquired PNA and is using 6LPM of 02. Pt. Currently in [facility] ICU.   Kana Dale LPN

## 2023-09-21 PROBLEM — R26.2 DIFFICULTY WALKING: Status: ACTIVE | Noted: 2023-09-02

## 2023-09-21 PROBLEM — I10 HTN (HYPERTENSION): Status: ACTIVE | Noted: 2019-04-11

## 2023-09-21 PROBLEM — R09.02 HYPOXIA: Status: ACTIVE | Noted: 2023-08-15

## 2023-09-21 PROBLEM — M54.41 CHRONIC BILATERAL LOW BACK PAIN WITH BILATERAL SCIATICA: Status: ACTIVE | Noted: 2019-04-11

## 2023-09-21 PROBLEM — M54.42 CHRONIC BILATERAL LOW BACK PAIN WITH BILATERAL SCIATICA: Status: ACTIVE | Noted: 2019-04-11

## 2023-09-21 PROBLEM — R79.89 ELEVATED TROPONIN: Status: ACTIVE | Noted: 2022-10-04

## 2023-09-21 PROBLEM — H25.12 NUCLEAR SCLEROTIC CATARACT, LEFT: Status: ACTIVE | Noted: 2019-06-06

## 2023-09-21 PROBLEM — G89.29 CHRONIC BILATERAL LOW BACK PAIN WITH BILATERAL SCIATICA: Status: ACTIVE | Noted: 2019-04-11

## 2023-09-21 PROBLEM — E78.2 MIXED HYPERLIPIDEMIA: Status: ACTIVE | Noted: 2019-04-11

## 2023-09-21 PROBLEM — R13.10 DYSPHAGIA: Status: ACTIVE | Noted: 2023-08-15

## 2023-09-21 PROBLEM — R06.02 SHORTNESS OF BREATH: Status: ACTIVE | Noted: 2023-07-23

## 2023-09-21 RX ORDER — LISINOPRIL 30 MG/1
30 TABLET ORAL DAILY
COMMUNITY
Start: 2023-07-29 | End: 2024-02-22 | Stop reason: ALTCHOICE

## 2023-09-21 RX ORDER — BACLOFEN 5 MG/1
5 TABLET ORAL 2 TIMES DAILY
COMMUNITY
Start: 2023-09-14 | End: 2023-09-28 | Stop reason: ALTCHOICE

## 2023-09-21 RX ORDER — GABAPENTIN 300 MG/1
300 CAPSULE ORAL 3 TIMES DAILY
COMMUNITY
Start: 2023-06-23 | End: 2023-11-28 | Stop reason: SDUPTHER

## 2023-09-21 RX ORDER — ACETAMINOPHEN 500 MG
TABLET ORAL
COMMUNITY
End: 2023-09-28 | Stop reason: SDUPTHER

## 2023-09-21 RX ORDER — IBUPROFEN 200 MG/1
200 TABLET, FILM COATED ORAL 2 TIMES DAILY
COMMUNITY
End: 2023-12-21 | Stop reason: ALTCHOICE

## 2023-09-21 RX ORDER — TRAMADOL HYDROCHLORIDE 50 MG/1
TABLET ORAL
COMMUNITY
Start: 2023-06-10 | End: 2023-09-28

## 2023-09-21 RX ORDER — TRAZODONE HYDROCHLORIDE 100 MG/1
TABLET ORAL
COMMUNITY
End: 2023-09-28 | Stop reason: SINTOL

## 2023-09-21 RX ORDER — LIDOCAINE 560 MG/1
1 PATCH PERCUTANEOUS; TOPICAL; TRANSDERMAL DAILY
COMMUNITY

## 2023-09-21 RX ORDER — GABAPENTIN 100 MG/1
CAPSULE ORAL
COMMUNITY
Start: 2023-05-29 | End: 2023-09-28

## 2023-09-21 RX ORDER — LIDOCAINE 50 MG/G
1 PATCH TOPICAL DAILY
COMMUNITY
End: 2023-12-21 | Stop reason: ALTCHOICE

## 2023-09-21 RX ORDER — AMLODIPINE BESYLATE 10 MG/1
10 TABLET ORAL DAILY
COMMUNITY
Start: 2023-07-29

## 2023-09-21 RX ORDER — PANTOPRAZOLE SODIUM 40 MG/1
TABLET, DELAYED RELEASE ORAL
COMMUNITY
Start: 2023-08-19

## 2023-09-21 RX ORDER — HYDROCODONE BITARTRATE AND ACETAMINOPHEN 5; 325 MG/1; MG/1
1 TABLET ORAL 2 TIMES DAILY
COMMUNITY
End: 2023-10-14 | Stop reason: SDUPTHER

## 2023-09-22 ENCOUNTER — TELEPHONE (OUTPATIENT)
Dept: PRIMARY CARE | Facility: CLINIC | Age: 83
End: 2023-09-22
Payer: MEDICARE

## 2023-09-22 NOTE — TELEPHONE ENCOUNTER
Ethel, PT with [facility], left a msg asking for a verbal ok to approve therapy for 2x/week for 5 weeks, then 1x/week for 4 weeks.   Ph: [phone]

## 2023-09-28 ENCOUNTER — OFFICE VISIT (OUTPATIENT)
Dept: PRIMARY CARE | Facility: CLINIC | Age: 83
End: 2023-09-28
Payer: MEDICARE

## 2023-09-28 DIAGNOSIS — E66.01 CLASS 2 SEVERE OBESITY WITH SERIOUS COMORBIDITY AND BODY MASS INDEX (BMI) OF 35.0 TO 35.9 IN ADULT, UNSPECIFIED OBESITY TYPE (MULTI): Primary | ICD-10-CM

## 2023-09-28 DIAGNOSIS — J47.9 BRONCHIECTASIS, UNCOMPLICATED (MULTI): ICD-10-CM

## 2023-09-28 PROCEDURE — G0439 PPPS, SUBSEQ VISIT: HCPCS | Performed by: INTERNAL MEDICINE

## 2023-09-28 PROCEDURE — 3078F DIAST BP <80 MM HG: CPT | Performed by: INTERNAL MEDICINE

## 2023-09-28 PROCEDURE — 1126F AMNT PAIN NOTED NONE PRSNT: CPT | Performed by: INTERNAL MEDICINE

## 2023-09-28 PROCEDURE — 3075F SYST BP GE 130 - 139MM HG: CPT | Performed by: INTERNAL MEDICINE

## 2023-09-28 PROCEDURE — 1160F RVW MEDS BY RX/DR IN RCRD: CPT | Performed by: INTERNAL MEDICINE

## 2023-09-28 PROCEDURE — 99214 OFFICE O/P EST MOD 30 MIN: CPT | Performed by: INTERNAL MEDICINE

## 2023-09-28 PROCEDURE — 1170F FXNL STATUS ASSESSED: CPT | Performed by: INTERNAL MEDICINE

## 2023-09-28 PROCEDURE — 1159F MED LIST DOCD IN RCRD: CPT | Performed by: INTERNAL MEDICINE

## 2023-09-28 PROCEDURE — 1036F TOBACCO NON-USER: CPT | Performed by: INTERNAL MEDICINE

## 2023-09-28 RX ORDER — LORATADINE 10 MG/1
10 TABLET ORAL DAILY
COMMUNITY

## 2023-09-28 RX ORDER — PREDNISONE 2.5 MG/1
2.5 TABLET ORAL DAILY
COMMUNITY
Start: 2023-08-27

## 2023-09-28 ASSESSMENT — ENCOUNTER SYMPTOMS
OCCASIONAL FEELINGS OF UNSTEADINESS: 1
LOSS OF SENSATION IN FEET: 1
DEPRESSION: 0

## 2023-09-28 ASSESSMENT — PATIENT HEALTH QUESTIONNAIRE - PHQ9
2. FEELING DOWN, DEPRESSED OR HOPELESS: NOT AT ALL
1. LITTLE INTEREST OR PLEASURE IN DOING THINGS: NOT AT ALL
SUM OF ALL RESPONSES TO PHQ9 QUESTIONS 1 AND 2: 0

## 2023-09-28 NOTE — PROGRESS NOTES
Subjective   Reason for Visit: Sabina Pal is an 82 y.o. female here for a Medicare Wellness visit.               HPI  Status post hospitalization after a fall.  Ultimately diagnosed with streptococcal pneumonia.  Treated with antibiotics.  Discharged to rehab there for 1 week now back in assisted living.  Overall improved.  Complicated by delirium but this is slowly resolving.  Very supportive family.  Denies cough chest pain shortness of breath.  No headache.  Cognition remains an issue.  Does have follow-up pending with pulmonology.  During transition was off of scheduled Norco, seems to be improving now that she is back on.  Has appointment pending with pain medicine.  Patient Care Team:  Beéln Lock MD as PCP - General (Internal Medicine)  Belén Lock MD as P[facility] Attributed [facility]  Kana Dale LPN as Care Manager (Case Management)     Review of Systems  All systems reviewed and negative except as per history of present illness  Objective   Vitals:  /78 (BP Location: Left arm, Patient Position: Sitting, BP Cuff Size: Adult)   Temp 36.4 °C (97.6 °F) (Skin)   Wt 87 kg (191 lb 12.8 oz)   BMI 35.08 kg/m²       Physical Exam  Constitutional:       Appearance: Normal appearance.   Cardiovascular:      Rate and Rhythm: Normal rate and regular rhythm.      Pulses: Normal pulses.      Heart sounds: Normal heart sounds. No murmur heard.  Pulmonary:      Effort: Pulmonary effort is normal. No respiratory distress.      Breath sounds: Normal breath sounds. No wheezing, rhonchi or rales.   Neurological:      Mental Status: She is alert.   Psychiatric:         Mood and Affect: Mood normal.         Behavior: Behavior normal.         Thought Content: Thought content normal.         Judgment: Judgment normal.         Assessment/Plan   Problem List Items Addressed This Visit    None  #1 vocal cord lesion-resolved, remote issue. Followup ear nose and throat prn  #2 shoulder pain- stable/improved,  continue physical therapy. f/u ortho/rheum. voltarin gel.   #3 hypothyroidism-stable followup with endocrinology(dr mascorro)  #4 elevated creatinine- stable/subtle. suspect she has stage 3 chronic kidney disease. will con't to follow. Order to patient for labs  #5 bronchiectasis- stable on low- dose Advair. Followup pulmonology. con't advair.   #6 osteopenia- stable. Followup rheumatology- con't Rx.  #7 hypertension- good. Continue treatment. Stay focused on low salt diet with exercise  #8 obstructive sleep apnea- refuses treating. Reviewed Ent EVAL.  SHE DECLINES RX.   #9 degenerative joint disease/inflammatory arthritis/sjogren's/sacroiliitis- followup rheumatology. low-dose prednisone.   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 f/u ENT.  #11 anxiety- good, con't. Very supportive family. Encouraged grief group. con't paroxetine to 20 mg. Consider reducing dose next vsit. Reviewed use and side effects  #12 palps- resolved  #13 imbalance- reviewed. Discussed safety issues. . use walker. f/u ENT. con't to use walker.  #14 insomnia- better.   #15 back pain- better, appt pending w/ pain med   #16 memory hjbzby-adpfeh-io geriatrics. Reviewed options at length. Recommend beginning Aricept. Discussed concern from pharmacist over QTC prolongation. Per this EMRs drug interaction calculator as well as up-to-date's interaction calculator there is no interaction with those medicines. However, after long discussion with family and patient they have opted to hold off on treatment for now. We discussed strategy of monitoring EKG as we add rx, they will consider. will increase memotine to 10 bid.   #17 hypothyroidism-unclear if patient is taking regularly. Reviewed with family. Reviewed taking on an empty stomach. Family will work on this and will retest in 6 weeks, adjust dose pending  #18 imbalance- better, s/p PT   #19 recent fall-continue PT. No sign of trauma  #20 possible type II MI-status post cardiology evaluation. Patient  stress test normal. No recommendations made. I do not have these results. We will try and obtain.  #21 LBP- better.  Still w/ difficulty getting out of a chair.  Working w/ PT.  Rec Lift-chair to asist w/ ADLS.  Con't norco every day.  Reviewed risks.   #22 pna- DOING WELL. f/u  Pulm at [facility] 10/23.   #23 candidal esophagitis- on rx, [facility] MD appt pending,  ? Increase gabapentin  Odin bid  d/w pt DNR issues.    f/u 4 mths  mammo--> up-to-date in chart       Patient was identified as a fall risk. Risk prevention instructions provided.

## 2023-09-29 VITALS
BODY MASS INDEX: 35.08 KG/M2 | SYSTOLIC BLOOD PRESSURE: 130 MMHG | TEMPERATURE: 97.6 F | DIASTOLIC BLOOD PRESSURE: 78 MMHG | WEIGHT: 191.8 LBS

## 2023-09-29 PROBLEM — J47.9 BRONCHIECTASIS, UNCOMPLICATED (MULTI): Status: ACTIVE | Noted: 2023-09-29

## 2023-09-29 ASSESSMENT — PATIENT HEALTH QUESTIONNAIRE - PHQ9
SUM OF ALL RESPONSES TO PHQ9 QUESTIONS 1 AND 2: 0
2. FEELING DOWN, DEPRESSED OR HOPELESS: NOT AT ALL
1. LITTLE INTEREST OR PLEASURE IN DOING THINGS: NOT AT ALL

## 2023-09-29 ASSESSMENT — ACTIVITIES OF DAILY LIVING (ADL)
BATHING: DEPENDENT
GROCERY_SHOPPING: TOTAL CARE
TAKING_MEDICATION: TOTAL CARE
MANAGING_FINANCES: TOTAL CARE
DOING_HOUSEWORK: TOTAL CARE
DRESSING: DEPENDENT

## 2023-09-29 NOTE — PATIENT INSTRUCTIONS

## 2023-10-12 ENCOUNTER — TELEPHONE (OUTPATIENT)
Dept: PRIMARY CARE | Facility: CLINIC | Age: 83
End: 2023-10-12
Payer: MEDICARE

## 2023-10-12 NOTE — TELEPHONE ENCOUNTER
Janee Magallanes, LPN at [facility], left a msg stating that the pt needs a refill of her Hydrocodone Acetaminophen 5-325 mg.  She takes one in the am and 1 in the evening, and 1 PRN as needed (this part is not in the chart).  Pharm is [facility] in [place].

## 2023-10-13 DIAGNOSIS — M54.50 LOW BACK PAIN, UNSPECIFIED BACK PAIN LATERALITY, UNSPECIFIED CHRONICITY, UNSPECIFIED WHETHER SCIATICA PRESENT: ICD-10-CM

## 2023-10-14 DIAGNOSIS — M48.061 SPINAL STENOSIS OF LUMBAR REGION, UNSPECIFIED WHETHER NEUROGENIC CLAUDICATION PRESENT: Primary | ICD-10-CM

## 2023-10-14 RX ORDER — HYDROCODONE BITARTRATE AND ACETAMINOPHEN 5; 325 MG/1; MG/1
1 TABLET ORAL 2 TIMES DAILY
Qty: 6 TABLET | Refills: 0 | Status: SHIPPED | OUTPATIENT
Start: 2023-10-14 | End: 2024-02-22 | Stop reason: WASHOUT

## 2023-10-14 NOTE — PROGRESS NOTES
Date of call: 10/14/23  Time of Call: 8:34 am  Time Call Returned: 8:50 am  Caller: Daughter  Reason for Call: Patient out of [place] as of yesterday. Her assisted living didn't call in until last minute and daughter was unaware. She takes 5-325 2 times daily. She requests that it  be sent to [facility].     Advised:  Bridge rx sent to pharmacy. Ok'd by PCP. 6 pills sent. OARRS reviewed.     Daughter agreed with plan

## 2023-10-16 ENCOUNTER — APPOINTMENT (OUTPATIENT)
Dept: PRIMARY CARE | Facility: CLINIC | Age: 83
End: 2023-10-16
Payer: MEDICARE

## 2023-10-16 RX ORDER — HYDROCODONE BITARTRATE AND ACETAMINOPHEN 5; 325 MG/1; MG/1
1 TABLET ORAL 2 TIMES DAILY
Qty: 20 TABLET | Refills: 0 | Status: SHIPPED | OUTPATIENT
Start: 2023-10-16 | End: 2023-10-17 | Stop reason: SDUPTHER

## 2023-10-18 RX ORDER — HYDROCODONE BITARTRATE AND ACETAMINOPHEN 5; 325 MG/1; MG/1
1 TABLET ORAL 2 TIMES DAILY
Qty: 70 TABLET | Refills: 0 | Status: SHIPPED | OUTPATIENT
Start: 2023-10-18 | End: 2023-10-24 | Stop reason: SDUPTHER

## 2023-10-20 ENCOUNTER — OFFICE VISIT (OUTPATIENT)
Dept: RHEUMATOLOGY | Facility: CLINIC | Age: 83
End: 2023-10-20
Payer: MEDICARE

## 2023-10-20 VITALS
SYSTOLIC BLOOD PRESSURE: 127 MMHG | HEART RATE: 79 BPM | DIASTOLIC BLOOD PRESSURE: 77 MMHG | BODY MASS INDEX: 33.42 KG/M2 | HEIGHT: 63 IN | WEIGHT: 188.6 LBS

## 2023-10-20 DIAGNOSIS — M19.011 LOCALIZED OSTEOARTHRITIS OF RIGHT SHOULDER: ICD-10-CM

## 2023-10-20 DIAGNOSIS — M35.00 SJOGREN'S SYNDROME, WITH UNSPECIFIED ORGAN INVOLVEMENT (MULTI): ICD-10-CM

## 2023-10-20 DIAGNOSIS — M81.0 AGE RELATED OSTEOPOROSIS, UNSPECIFIED PATHOLOGICAL FRACTURE PRESENCE: Primary | ICD-10-CM

## 2023-10-20 PROCEDURE — 20610 DRAIN/INJ JOINT/BURSA W/O US: CPT | Mod: RT,PN | Performed by: INTERNAL MEDICINE

## 2023-10-20 PROCEDURE — 1126F AMNT PAIN NOTED NONE PRSNT: CPT | Performed by: INTERNAL MEDICINE

## 2023-10-20 PROCEDURE — 99214 OFFICE O/P EST MOD 30 MIN: CPT | Mod: PN | Performed by: INTERNAL MEDICINE

## 2023-10-20 PROCEDURE — 1036F TOBACCO NON-USER: CPT | Performed by: INTERNAL MEDICINE

## 2023-10-20 PROCEDURE — 1157F ADVNC CARE PLAN IN RCRD: CPT | Performed by: INTERNAL MEDICINE

## 2023-10-20 PROCEDURE — 1160F RVW MEDS BY RX/DR IN RCRD: CPT | Performed by: INTERNAL MEDICINE

## 2023-10-20 PROCEDURE — 96372 THER/PROPH/DIAG INJ SC/IM: CPT | Mod: 59 | Performed by: INTERNAL MEDICINE

## 2023-10-20 PROCEDURE — 99214 OFFICE O/P EST MOD 30 MIN: CPT | Performed by: INTERNAL MEDICINE

## 2023-10-20 PROCEDURE — 3078F DIAST BP <80 MM HG: CPT | Performed by: INTERNAL MEDICINE

## 2023-10-20 PROCEDURE — 2500000004 HC RX 250 GENERAL PHARMACY W/ HCPCS (ALT 636 FOR OP/ED): Mod: JZ | Performed by: INTERNAL MEDICINE

## 2023-10-20 PROCEDURE — 1159F MED LIST DOCD IN RCRD: CPT | Performed by: INTERNAL MEDICINE

## 2023-10-20 PROCEDURE — 3074F SYST BP LT 130 MM HG: CPT | Performed by: INTERNAL MEDICINE

## 2023-10-20 PROCEDURE — 96401 CHEMO ANTI-NEOPL SQ/IM: CPT | Performed by: INTERNAL MEDICINE

## 2023-10-20 PROCEDURE — 2500000004 HC RX 250 GENERAL PHARMACY W/ HCPCS (ALT 636 FOR OP/ED): Mod: PN | Performed by: INTERNAL MEDICINE

## 2023-10-20 RX ORDER — TRIAMCINOLONE ACETONIDE 40 MG/ML
40 INJECTION, SUSPENSION INTRA-ARTICULAR; INTRAMUSCULAR
Status: COMPLETED | OUTPATIENT
Start: 2023-10-20 | End: 2023-10-20

## 2023-10-20 RX ADMIN — DENOSUMAB 60 MG: 60 INJECTION SUBCUTANEOUS at 11:58

## 2023-10-20 RX ADMIN — TRIAMCINOLONE ACETONIDE 40 MG: 40 INJECTION, SUSPENSION INTRA-ARTICULAR; INTRAMUSCULAR at 11:00

## 2023-10-20 ASSESSMENT — ENCOUNTER SYMPTOMS
DEPRESSION: 0
SLEEP DISTURBANCE: 0
SHORTNESS OF BREATH: 0
OCCASIONAL FEELINGS OF UNSTEADINESS: 1
LOSS OF SENSATION IN FEET: 1
FATIGUE: 1
ABDOMINAL PAIN: 0

## 2023-10-20 NOTE — PROGRESS NOTES
Subjective   Patient ID: Sabina Pal is a 82 y.o. female who presents for Follow-up (Prolia injection and patient also c/o right shoulder pain. Requests cortisone injection. Prolia was B&B. ).  HPI  Patient with history of Sjogren's with negative CARMEN/SHARONDA, osteoarthritis, osteoporosis on chronic prednisone.  Patient is accompanied by her daughter today  Her last visit on July 7, 2023 was a virtual visit and I was unable to physically evaluate her.    We had discussed about her continuing with Prolia which she has been injected with today.  She will be due for bone density later this year.  Also had her continue with Plaquenil.    She has recently established with pain management and had a trial injection which was helpful.  She will go for another trial injection in November and if that is also helpful she will have a real injection for her back.    She has not had an injection in her shoulder for some time and has been very painful for her.    Since I had last talked to her she was in the hospital in July and 2 weeks later started choking.  Was found to have thrush.  2 weeks after that she fell and hit her head.  She went to the emergency room and cleared her but she could not walk and she was admitted.  She was found to have pneumonia and was in the ICU.    She does walk with a walker.  Does not walk very far.  She reads and colors in her room.  She does read and colors .  Review of Systems   Constitutional:  Positive for fatigue.   HENT:  Positive for hearing loss.    Respiratory:  Negative for shortness of breath.    Cardiovascular:  Negative for chest pain.   Gastrointestinal:  Negative for abdominal pain.   Musculoskeletal:         Per HPI   Skin:  Negative for rash.   Psychiatric/Behavioral:  Negative for sleep disturbance.        Objective   Physical Exam  GEN: NAD A&O x3 appropriate affect  HENT:MMM, no oral or nasal ulcers,PERRLA, no enlarged glands or thyroid  EYES:  no conjunctival redness, eyelids  normal  LYMPH: no cervical or axillary lymphadenopathy  CV: RRR, no MRG, no lower extremity edema  RESP: CTA B/L with normal respiratory effort and no intercostal retractions  ABD: Soft non-tender non-distended, no hepatosplenomegaly  NEURO: 5/5 strength upper and lower extremities, DTR +2 bicep and patellar tendons  SKIN: no rashes, ulcers, or subcutaneous nodules  PULSES: +radials  TENDER POINTS: 0/18   MSK:    Assessment/Plan        Osteoporosis - On chronic prednisone.  Has been on Prolia since August 2020.  She will be due for a bone density in December 2023.  Patient injected with Prolia today.  Injected 60 mg lot [id number] expiration date 02/28/2026 into the left upper extremity subcutaneously.    Osteoarthritis and previous diagnosis of Sjogren's with negative CARMEN/SHARONDA panel.  Has been on hydroxychloroquine.  Reviewed her recent blood work    Right shoulder pain injected this laterally with 40 mg of Kenalog.      Patient ID: Sabina Pal is a 82 y.o. female.    Large Joint Injection/Arthrocentesis: R subacromial bursa on 10/20/2023 11:00 AM  Indications: pain  Details: 22 G needle, lateral approach  Medications: 40 mg triamcinolone acetonide 40 mg/mL  Outcome: tolerated well, no immediate complications  Procedure, treatment alternatives, risks and benefits explained, specific risks discussed. Immediately prior to procedure a time out was called to verify the correct patient, procedure, equipment, support staff and site/side marked as required. Patient was prepped and draped in the usual sterile fashion.

## 2023-10-23 ENCOUNTER — TELEPHONE (OUTPATIENT)
Dept: PRIMARY CARE | Facility: CLINIC | Age: 83
End: 2023-10-23
Payer: MEDICARE

## 2023-10-23 NOTE — TELEPHONE ENCOUNTER
Jose Coello, left a msg asking for a refill of the pt's scheduled and PRN Norco.  Pharm is [facility].

## 2023-10-24 DIAGNOSIS — M54.50 LOW BACK PAIN, UNSPECIFIED BACK PAIN LATERALITY, UNSPECIFIED CHRONICITY, UNSPECIFIED WHETHER SCIATICA PRESENT: ICD-10-CM

## 2023-10-24 RX ORDER — HYDROCODONE BITARTRATE AND ACETAMINOPHEN 5; 325 MG/1; MG/1
1 TABLET ORAL 2 TIMES DAILY
Qty: 70 TABLET | Refills: 0 | Status: SHIPPED | OUTPATIENT
Start: 2023-10-24 | End: 2023-10-31 | Stop reason: SDUPTHER

## 2023-10-31 RX ORDER — HYDROCODONE BITARTRATE AND ACETAMINOPHEN 5; 325 MG/1; MG/1
1 TABLET ORAL 2 TIMES DAILY
Qty: 70 TABLET | Refills: 0 | Status: SHIPPED | OUTPATIENT
Start: 2023-10-31 | End: 2023-11-27 | Stop reason: SDUPTHER

## 2023-11-07 ENCOUNTER — TELEPHONE (OUTPATIENT)
Dept: PRIMARY CARE | Facility: CLINIC | Age: 83
End: 2023-11-07
Payer: MEDICARE

## 2023-11-07 NOTE — TELEPHONE ENCOUNTER
Oumou, nurse with [facility], left a msg stating that the pt's family left A&D ointment and Aquaphor ointment to use on the pt's red bottom.  They need an order faxed or a verbal called in.   Ph:  [phone]  fx:  [phone]

## 2023-11-13 ENCOUNTER — ANCILLARY PROCEDURE (OUTPATIENT)
Dept: RADIOLOGY | Facility: CLINIC | Age: 83
End: 2023-11-13
Payer: MEDICARE

## 2023-11-13 DIAGNOSIS — M81.0 AGE RELATED OSTEOPOROSIS, UNSPECIFIED PATHOLOGICAL FRACTURE PRESENCE: ICD-10-CM

## 2023-11-13 PROCEDURE — 77080 DXA BONE DENSITY AXIAL: CPT

## 2023-11-13 PROCEDURE — 77081 DXA BONE DENSITY APPENDICULR: CPT | Performed by: RADIOLOGY

## 2023-11-15 ENCOUNTER — TELEPHONE (OUTPATIENT)
Dept: PRIMARY CARE | Facility: CLINIC | Age: 83
End: 2023-11-15
Payer: MEDICARE

## 2023-11-15 NOTE — TELEPHONE ENCOUNTER
Ethel King, PT with [facility], left a msg wanting to re-cert the pt for more PT as she is still having a lot of back ,hip, and shoulder pain, along with difficulty getting around.  It will be 1/wk for 1 week, 2/wk for 2 weeks, and 1/wk for 6 weeks                         ph: [phone]

## 2023-11-22 ENCOUNTER — TELEPHONE (OUTPATIENT)
Dept: PRIMARY CARE | Facility: CLINIC | Age: 83
End: 2023-11-22
Payer: MEDICARE

## 2023-11-22 NOTE — TELEPHONE ENCOUNTER
Bianca, pt's daughter, left a msg that recently mom choked on food, and EMS was called,  She ended up vomiting, and is good now.  The [facility] staff is stating that they need an order stating tht the pt needs her food cut up and more moist.  Bianca is asking for a VV appt ASAP if that is necessary to get the order.

## 2023-11-22 NOTE — TELEPHONE ENCOUNTER
Brittany[facility], left a msg to let you know that this pt is being sent to [facility] for SOB and CP.

## 2023-11-27 ENCOUNTER — TELEPHONE (OUTPATIENT)
Dept: PRIMARY CARE | Facility: CLINIC | Age: 83
End: 2023-11-27
Payer: MEDICARE

## 2023-11-27 DIAGNOSIS — M54.50 LOW BACK PAIN, UNSPECIFIED BACK PAIN LATERALITY, UNSPECIFIED CHRONICITY, UNSPECIFIED WHETHER SCIATICA PRESENT: ICD-10-CM

## 2023-11-27 RX ORDER — HYDROCODONE BITARTRATE AND ACETAMINOPHEN 5; 325 MG/1; MG/1
1 TABLET ORAL 2 TIMES DAILY
Qty: 70 TABLET | Refills: 0 | Status: SHIPPED | OUTPATIENT
Start: 2023-11-27 | End: 2024-01-30 | Stop reason: WASHOUT

## 2023-11-27 NOTE — TELEPHONE ENCOUNTER
Bianca, pt's daughter, left a msg requesting a VV to discuss Sabina choking on food.  She choked 3 times on Saturday.  She is also asking if you would increase her Gabapentin.  Sabina is having debilitating sciatica pain.  They called her pain management dr, and he wants her to be taken to the ER to be evaluated.  It seems Sabina fell out of be on Wednesday.  Bianca will notify us of the outcome from the ER.  She said speech therapy will be seeing Sabina at the [facility] facility.

## 2023-11-27 NOTE — TELEPHONE ENCOUNTER
The nurse from [facility] left a msg asking for a refill of the pt's Hydrocodone 5-325 mg, which she takes 2 times daily and PRN every 6 hours for pain.  Pharm is [facility].

## 2023-11-27 NOTE — TELEPHONE ENCOUNTER
Haroldo [employer], left a msg asking for a refill of the pt's Hydrocodone 5-325 mg.  Pharm is [facility].  It's for both scheduled and PRN.

## 2023-11-28 ENCOUNTER — APPOINTMENT (OUTPATIENT)
Dept: AUDIOLOGY | Facility: CLINIC | Age: 83
End: 2023-11-28
Payer: MEDICARE

## 2023-11-28 RX ORDER — GABAPENTIN 400 MG/1
400 CAPSULE ORAL 3 TIMES DAILY
Qty: 270 CAPSULE | Refills: 0 | Status: SHIPPED | OUTPATIENT
Start: 2023-11-28 | End: 2024-01-29 | Stop reason: WASHOUT

## 2023-11-28 NOTE — TELEPHONE ENCOUNTER
Pt's daughter, Bianca, left another msg today stating that mom is in such severe pain, that she has become bed ridden.  She is wanting her Gabapentin increased.

## 2023-11-28 NOTE — TELEPHONE ENCOUNTER
Enter script please.  TY  [facility] nurse, Haroldo, said that if we get it sent soon, it might make the delivery for tonight.

## 2023-11-29 ENCOUNTER — TELEPHONE (OUTPATIENT)
Dept: PRIMARY CARE | Facility: CLINIC | Age: 83
End: 2023-11-29
Payer: MEDICARE

## 2023-11-29 NOTE — TELEPHONE ENCOUNTER
I called [facility] and spoke with Patience, Sabina's nurse, and she said she would check with the ADON on the scripts.

## 2023-11-29 NOTE — TELEPHONE ENCOUNTER
----- Message from Harper Olivarez CMA sent at 11/29/2023 10:42 AM EST -----  Regarding: FW: Increase Norco and gabapentin   Contact: [phone]  Are you able to verify with [facility] that the pt did receive both rx's please  ----- Message -----  From: Sabina Pal  Sent: 11/29/2023   9:54 AM EST  To: [place] [id number] Clinical Support Staff  Subject: Increase Norco and gabapentin                    Good morning.  I'm not sure. [facility] is who you would need to ask. All prescriptions come to them through [facility].

## 2023-12-01 ENCOUNTER — TELEPHONE (OUTPATIENT)
Dept: PRIMARY CARE | Facility: CLINIC | Age: 83
End: 2023-12-01
Payer: MEDICARE

## 2023-12-01 NOTE — TELEPHONE ENCOUNTER
Oumou, nurse with [facility], left a msg to let you know they are sending the pt to [facility] for extreme lower back pain.

## 2023-12-04 ENCOUNTER — APPOINTMENT (OUTPATIENT)
Dept: PRIMARY CARE | Facility: CLINIC | Age: 83
End: 2023-12-04
Payer: MEDICARE

## 2023-12-14 ENCOUNTER — TELEPHONE (OUTPATIENT)
Dept: PRIMARY CARE | Facility: CLINIC | Age: 83
End: 2023-12-14
Payer: MEDICARE

## 2023-12-14 ENCOUNTER — PATIENT OUTREACH (OUTPATIENT)
Dept: PRIMARY CARE | Facility: CLINIC | Age: 83
End: 2023-12-14
Payer: MEDICARE

## 2023-12-14 NOTE — TELEPHONE ENCOUNTER
Patient is scheduled for a vv on 12/18; spoke with other daughter Brittany who had questions regarding her new medications on when to take them.  They also want to get her a hospital bed and need a script from you.  Brittany said if you wanted to wait until her apt to discuss they would understand.

## 2023-12-14 NOTE — TELEPHONE ENCOUNTER
Bianca left a msg stating that they are looking for an order for a hospital bed for mom.  While in the hospital, she wasn't having problems with her sciatica and the  suggested she get one for home.

## 2023-12-14 NOTE — TELEPHONE ENCOUNTER
Ethel, with [facility], left a msg asking for verbal orders for PT/OT/Speech for the pt.

## 2023-12-14 NOTE — PROGRESS NOTES
Discharge Facility: [facility]    Discharge Diagnosis:  Hypoxia (Primary Dx);   Chronic right-sided low back pain with right-sided sciatica;   Dementia without behavioral disturbance, psychotic disturbance, mood disturbance, or anxiety, unspecified dementia severity, unspecified dementia type (HCC);   Aspiration pneumonia, unspecified aspiration pneumonia type, unspecified laterality, unspecified part of lung (HCC);   Aspiration pneumonia (CMS/HCC) (HCC);   Dysphagia, unspecified type;   History of Sjogren's disease (HCC);   Chronic bilateral low back pain with bilateral sciatica   Admission Date: 12-1-23   Discharge Date: 12-13-23     PCP Appointment Date: 12-18-23   Specialist Appointment Date: n/a   Hospital Encounter and Summary:  not available at this time   See discharge assessment below for further details  Engagement  Call Start Time: 1337 (12/14/2023  1:39 PM)    Medications  Medications reviewed with patient/caregiver?: Yes (12/14/2023  1:39 PM)  Is the patient having any side effects they believe may be caused by any medication additions or changes?: No (12/14/2023  1:39 PM)  Does the patient have all medications ordered at discharge?: Yes (12/14/2023  1:39 PM)  Care Management Interventions: No intervention needed (12/14/2023  1:39 PM)  Is the patient taking all medications as directed (includes completed medication regime)?: Yes (12/14/2023  1:39 PM)  Care Management Interventions: Provided patient education (12/14/2023  1:39 PM)    Appointments  Does the patient have a primary care provider?: Yes (12/14/2023  1:39 PM)  Care Management Interventions: Verified appointment date/time/provider (12/14/2023  1:39 PM)  Has the patient kept scheduled appointments due by today?: Yes (12/14/2023  1:39 PM)  Care Management Interventions: Advised patient to keep appointment (12/14/2023  1:39 PM)    Self Management  What is the home health agency?: Pt. in assisted living (12/14/2023  1:39  PM)    Patient Teaching  Does the patient have access to their discharge instructions?: Yes (12/14/2023  1:39 PM)  Care Management Interventions: Reviewed instructions with patient (12/14/2023  1:39 PM)  What is the patient's perception of their health status since discharge?: Same (12/14/2023  1:39 PM)  Is the patient/caregiver able to teach back the hierarchy of who to call/visit for symptoms/problems? PCP, Specialist, Home Health nurse, Urgent Care, ED, 911: Yes (12/14/2023  1:39 PM)      Kana Dale LPN

## 2023-12-14 NOTE — TELEPHONE ENCOUNTER
Bianca , pt's daughter, left a msg stating that mom is back at [facility], and is needing a schedule for 2 of her meds. She was taken of Norco and put on Oxycodone 5 mg,  She is suppose to get this every 6 hrs, and was only given a 4 day script.  Her Gabapentin needs a schedule of 3x's a day, 6am, 2pm, and 10pm. Bianca is also asking for her Lasix to be stopped.  She is not able to sleep as she is up every 1/2 hour, and not making it to the bathroom and having accidents.  Please fax orders to [facility].

## 2023-12-15 DIAGNOSIS — G89.29 CHRONIC LOW BACK PAIN WITH SCIATICA, SCIATICA LATERALITY UNSPECIFIED, UNSPECIFIED BACK PAIN LATERALITY: ICD-10-CM

## 2023-12-15 DIAGNOSIS — M54.40 CHRONIC LOW BACK PAIN WITH SCIATICA, SCIATICA LATERALITY UNSPECIFIED, UNSPECIFIED BACK PAIN LATERALITY: ICD-10-CM

## 2023-12-15 NOTE — TELEPHONE ENCOUNTER
Spoke to nurse, gave order to hold lasix for now, and she will fax over to my rightfax the current med list, then I will inform Dr Lock

## 2023-12-18 ENCOUNTER — TELEMEDICINE (OUTPATIENT)
Dept: PRIMARY CARE | Facility: CLINIC | Age: 83
End: 2023-12-18
Payer: MEDICARE

## 2023-12-18 DIAGNOSIS — I73.9 CLAUDICATION, INTERMITTENT (CMS-HCC): ICD-10-CM

## 2023-12-18 DIAGNOSIS — I10 BENIGN ESSENTIAL HYPERTENSION: Primary | ICD-10-CM

## 2023-12-18 DIAGNOSIS — G89.29 CHRONIC LOW BACK PAIN, UNSPECIFIED BACK PAIN LATERALITY, UNSPECIFIED WHETHER SCIATICA PRESENT: ICD-10-CM

## 2023-12-18 DIAGNOSIS — N18.31 STAGE 3A CHRONIC KIDNEY DISEASE (MULTI): ICD-10-CM

## 2023-12-18 DIAGNOSIS — I10 TYPE 2 MYOCARDIAL INFARCTION DUE TO HYPERTENSION (MULTI): ICD-10-CM

## 2023-12-18 DIAGNOSIS — I21.A1 TYPE 2 MYOCARDIAL INFARCTION DUE TO HYPERTENSION (MULTI): ICD-10-CM

## 2023-12-18 DIAGNOSIS — M54.50 CHRONIC LOW BACK PAIN, UNSPECIFIED BACK PAIN LATERALITY, UNSPECIFIED WHETHER SCIATICA PRESENT: ICD-10-CM

## 2023-12-18 PROCEDURE — 99214 OFFICE O/P EST MOD 30 MIN: CPT | Performed by: INTERNAL MEDICINE

## 2023-12-18 NOTE — PROGRESS NOTES
"Subjective   Patient ID: Sabina Pal is a 83 y.o. female who presents for f/u  hospital    [facility] 12 1-12 13.  Discharge summary reviewed.  \"Hospital Course: Sabina is a 82 y.o. female with past medical history of S hypertension, hyperlipidemia, hypothyroidism, dementia, sjogren's syndrome on prednisone 7.5 mg daily, CKD stage II, chronic back pain with occasional flares of sciatica presented from assisted living with worsening low back pain. X-ray lumbar spine negative for fractures. She was found to be hypoxic in the ED. Respiratory viral panel negative. CT abdomen no acute process showed bibasilar opacities, atelectasis. Chest x-ray showed cardiomegaly with pulmonary venous congestion. Admitted for further management. Patient later on became acutely hypoxic and had to be transferred to the ICU. She was placed on NIV and high flow oxygen. She underwent bronchoscopy and noted to have severe tracheobronchomalacia. Cultures from BAL showed MRSA. She was started on broad-spectrum antibiotics. She was transferred out of ICU. Respiratory status continues to improve. Seen by GI and underwent EGD on 12/11/23 which showed patient having candida esophagitis. See discharge diagnoses list above and medication adjustments below in med rec. The patient is discharged in improved and stable condition.     Review of Systems    Objective   There were no vitals taken for this visit.    Physical Exam    Assessment/Plan       #1 vocal cord lesion-resolved, remote issue. Followup ear nose and throat prn  #2 shoulder pain- stable/improved, continue physical therapy. f/u ortho/rheum. voltarin gel.   #3 hypothyroidism-stable followup with endocrinology(dr mascorro)  #4 elevated creatinine- stable/subtle. suspect she has stage 3 chronic kidney disease. will con't to follow. Order to patient for labs  #5 bronchiectasis- stable on low- dose Advair. Followup pulmonology. con't advair.   #6 osteopenia- stable. Followup rheumatology- " con't Rx.  #7 hypertension- good. Continue treatment. Stay focused on low salt diet with exercise  #8 obstructive sleep apnea- refuses treating. Reviewed Ent EVAL.  SHE DECLINES RX.   #9 degenerative joint disease/inflammatory arthritis/sjogren's/sacroiliitis- followup rheumatology. low-dose prednisone.   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 f/u ENT.  #11 anxiety- good, con't. Very supportive family. Encouraged grief group. con't paroxetine to 20 mg. Consider reducing dose next vsit. Reviewed use and side effects  #12 palps- resolved  #13 imbalance- reviewed. Discussed safety issues. . use walker. f/u ENT. con't to use walker.  #14 insomnia- better.   #15 back pain- s/p MRI.  better, appt pending w/ pain med   #16 memory tdyjih-papjkr-tj geriatrics. Reviewed options at length. Recommend beginning Aricept. Discussed concern from pharmacist over QTC prolongation. Per this EMRs drug interaction calculator as well as up-to-date's interaction calculator there is no interaction with those medicines. However, after long discussion with family and patient they have opted to hold off on treatment for now. We discussed strategy of monitoring EKG as we add rx, they will consider. will increase memotine to 10 bid.   #17 hypothyroidism-unclear if patient is taking regularly. Reviewed with family. Reviewed taking on an empty stomach. Family will work on this and will retest in 6 weeks, adjust dose pending  #18 imbalance- better, s/p PT   #19 recent fall-continue PT. No sign of trauma  #20 possible type II MI-status post cardiology evaluation. Patient stress test normal. No recommendations made. I do not have these results. We will try and obtain.  #21 LBP- better.  Still w/ difficulty getting out of a chair.  Working w/ PT.  Rec Lift-chair to asist w/ ADLS.  Con't norco every day.  Reviewed risks.   #22 pna- DOING WELL. f/u  [facility]m [facility]  #23 candidal esophagitis- swallowing better, on rx, f/u  GI at [facility]  PRN.  Will  try to reduce prednisone and follow     [facility]- Pain MD appt pending,  ? Increase gabapentin    - Will try to reduce prednisone to 5/7.5 x 1 mths  -f/up mammo 3 mths  -mammo--> up-to-date in chart  -hospital bed due to significant back pain.  Noted in hospital pain much better controlled with adjustable bed.  Given that pain/back pain is perhaps her primary medical problem feel that a hospital bed is certainly justified.  -Off norco--> added oxy prn , sched cymbalta in hospital.  f/u  w/ pain clinic  -Check tsh  -On lasix, will stop  -Rf oxy     Patient was identified as a fall risk. Risk prevention instructions provided.

## 2023-12-19 ENCOUNTER — TELEPHONE (OUTPATIENT)
Dept: PRIMARY CARE | Facility: CLINIC | Age: 83
End: 2023-12-19
Payer: MEDICARE

## 2023-12-19 ENCOUNTER — PATIENT OUTREACH (OUTPATIENT)
Dept: PRIMARY CARE | Facility: CLINIC | Age: 83
End: 2023-12-19
Payer: MEDICARE

## 2023-12-19 NOTE — PROGRESS NOTES
Call regarding appt. with PCP on (12-18-23) after hospitalization.  At time of outreach call the patient feels as if their condition has (improved) since last visit.  Reviewed the PCP appointment with the pt and addressed any questions or concerns.    Pt. Daughter did state that pt. Feel from bed on 12-18-23 and all tests were negative.   Kana Dale LPN

## 2023-12-19 NOTE — TELEPHONE ENCOUNTER
Sabina's daughter Bianca called and said that her mother is back from the ER and she is not receiving her meds from [facility] because they are waiting to hear from you. Please call [facility].

## 2023-12-19 NOTE — TELEPHONE ENCOUNTER
"Brittany Nguyen, nurse at [facility], also called regarding Sabina, stating that she needs to verify orders from [facility] discharge.  The discharge info said \"to talk with provider about your meds.  Ask how take Oxycodone.\"      Also needs to verify Furosemide orders - they were instructed to hold this.    She needs to make sure these are the correct orders on file.    Please call her back at [phone].  "

## 2023-12-21 ENCOUNTER — TELEPHONE (OUTPATIENT)
Dept: PRIMARY CARE | Facility: CLINIC | Age: 83
End: 2023-12-21
Payer: MEDICARE

## 2023-12-21 DIAGNOSIS — M54.31 BILATERAL SCIATICA: ICD-10-CM

## 2023-12-21 DIAGNOSIS — G89.29 CHRONIC LOW BACK PAIN, UNSPECIFIED BACK PAIN LATERALITY, UNSPECIFIED WHETHER SCIATICA PRESENT: ICD-10-CM

## 2023-12-21 DIAGNOSIS — M54.32 BILATERAL SCIATICA: ICD-10-CM

## 2023-12-21 DIAGNOSIS — M54.50 CHRONIC LOW BACK PAIN, UNSPECIFIED BACK PAIN LATERALITY, UNSPECIFIED WHETHER SCIATICA PRESENT: ICD-10-CM

## 2023-12-21 RX ORDER — OXYCODONE HYDROCHLORIDE 5 MG/1
5 TABLET ORAL EVERY 6 HOURS PRN
COMMUNITY
Start: 2023-12-13 | End: 2023-12-30 | Stop reason: SDUPTHER

## 2023-12-21 RX ORDER — DULOXETIN HYDROCHLORIDE 60 MG/1
60 CAPSULE, DELAYED RELEASE ORAL DAILY
COMMUNITY
Start: 2023-12-13

## 2023-12-21 RX ORDER — POLYETHYLENE GLYCOL 3350 17 G/17G
17 POWDER, FOR SOLUTION ORAL ONCE
COMMUNITY

## 2023-12-21 RX ORDER — GABAPENTIN 300 MG/1
300 CAPSULE ORAL 3 TIMES DAILY
COMMUNITY
Start: 2023-12-13

## 2023-12-21 RX ORDER — FLUCONAZOLE 100 MG/1
100 TABLET ORAL ONCE
COMMUNITY
Start: 2023-12-13 | End: 2024-01-30 | Stop reason: WASHOUT

## 2023-12-21 NOTE — TELEPHONE ENCOUNTER
SPOKE WITH PATIENCE AT [facility] AND WHEN PT CAME BACK FROM THE HOSPITAL SHE CAME BACK WITH [facility] ON ALL HER  MEDICATIONS AND THEY NEED  AN ORDER FOR ALL OF HER MEDICATIONS TO RESTART AND THEY ARE ALSO NEED AN OK FOR THE DIRECTIONS OF THE OXYCODONE 5 MG 1 TABLET EVERY 6 HOURS PRN, AND FOR MAX TUSSIN 200/20 MG  5 ML AS NEED FOR COUGH AND HER BREATHING TREATMENT EVERY 12 HOURS       PLEASE REVIEW AND ADVISE     I BELIEVE I UPDATED HER MEDICATION LIST IN THE CHART TO WHAT MEDICATION LIST THEY HAVE FOR PT AT [place]

## 2023-12-21 NOTE — TELEPHONE ENCOUNTER
SPOKE WITH PATIENCE AT [facility] TO LET KNOW PER DR TIJERINA PT IS TO TAKE THE OXYCODONEAS PRESCRIBED BY THE HOSPITAL,  PER ANYI PT CAME BACK WITH [facility] MARKS BY HER MEDICATIONS WITH NO ORDERS ON ANY OF THE MEDICATIONS OR DIRECTIONS.  I LET PATIENCE KNOW THAT I WILL REDIRECTED MESSAGE BACK TO DR TIJERINA AND GET ORDERS AND CLARIFICATION ON DIRECTIONS FOR ALL HER MEDICATIONS AND CALL HER BACK

## 2023-12-21 NOTE — TELEPHONE ENCOUNTER
SPOKE WITH PATIENCE AT [facility] TO LET KNOW PER DR TIJERINA SHE IS TO CONTINUE MEDICATION WITH DIRECTIONS PRIOR TO EMERGENCY ROOM

## 2023-12-21 NOTE — TELEPHONE ENCOUNTER
----- Message from Belén Lock MD sent at 12/21/2023  7:10 AM EST -----  Regarding: FW: Lasix  Contact: [phone]  Ok to discontinue lasix  ----- Message -----  From: Stacy Peoples MA  Sent: 12/19/2023   3:14 PM EST  To: Belén Lock MD  Subject: FW: Lasix                                          ----- Message -----  From: Sabina Pal  Sent: 12/19/2023   3:13 PM EST  To: [place] [id number] Clinical Support Staff  Subject: Lasix                                            Dr. Lock I know you sent an order yesterday for [facility] to stop the Lasix. Today, my mom went to the ER because she fell out of bed. Everything is fine with her, but for some reason the ER sent her discharge papers with question marks next to all her meds including the Lasix, so [facility] is disregarding your order from yesterday and they need a new one today. I am so sorry about all the orders, but it is the only way they will do what we want  Thank you  Bianca Da Silva

## 2023-12-26 NOTE — TELEPHONE ENCOUNTER
SPOKE WITH MARYANN AT [facility] AND LET HER KNOW THAT DR TIJERINA GAVE THE OK TO RESTART HER ARTIFICAL EYES AND THAT IT IS OK TO ORDER SUPERVISION WITH MEALS. PER MARYANN THE FAMILY WAS NOTIFIED BY THE SPEECH DEPARTMENT THAT THEY DO NOT HAVE STAFFING TO SUPERVISE  PT WITH HER MEALS AND [facility] SHOULD HAVE HER MEALS IN THE DINNING ROOM WHERE THERE ARE AIDS IF HELP IS NEEDED.

## 2023-12-27 NOTE — TELEPHONE ENCOUNTER
SPOKE WITH ABRAHAM AT [facility] AND ORDER FOR THE HOSPITAL BED NEEDS TO GO TO [facility]  ATTN: TOREY HATCH FAX # [phone].    ORDER PLACED INTO THE SYSTEM PER DR TIJERINA AND OFFICE POLICY FOR THE HOSPITAL BED AND FAXED TO [facility] AS REQUESTED

## 2023-12-29 ENCOUNTER — TELEPHONE (OUTPATIENT)
Dept: PRIMARY CARE | Facility: CLINIC | Age: 83
End: 2023-12-29
Payer: MEDICARE

## 2023-12-29 NOTE — TELEPHONE ENCOUNTER
Pt's daughter, Bianca, left a msg stating that Sabina will be having an injection in her back on Tuesday, 1/2/24, at pain management.  She is asking for Tuesday only, to have a scheduled Oxycodone to be given at 4 am and Gabapentin at 6 am, so that she is in as little as possible pain when she receives this injection.  She said that pain management agrees with this.  Pt is on Oxy PRN, and Gabapentin 3 times a day.  She wants this order sent today to be in place for Tuesday morning.  Pt resides at [facility].

## 2023-12-29 NOTE — TELEPHONE ENCOUNTER
Patient daughter came in with the concern of getting this into to [facility]. Called Coty at [facility] and gave verbal okay to give oxycodone at 4 am and gabapentin at 6am. She verbalized understanding.

## 2023-12-30 DIAGNOSIS — M54.50 LOW BACK PAIN, UNSPECIFIED BACK PAIN LATERALITY, UNSPECIFIED CHRONICITY, UNSPECIFIED WHETHER SCIATICA PRESENT: Primary | ICD-10-CM

## 2023-12-30 PROBLEM — I10 HTN (HYPERTENSION): Status: RESOLVED | Noted: 2019-04-11 | Resolved: 2023-12-30

## 2023-12-30 RX ORDER — OXYCODONE HYDROCHLORIDE 5 MG/1
5 TABLET ORAL EVERY 6 HOURS PRN
Qty: 15 TABLET | Refills: 0 | Status: SHIPPED | OUTPATIENT
Start: 2023-12-30 | End: 2024-03-19 | Stop reason: SDUPTHER

## 2024-01-02 DIAGNOSIS — M54.50 LOW BACK PAIN, UNSPECIFIED BACK PAIN LATERALITY, UNSPECIFIED CHRONICITY, UNSPECIFIED WHETHER SCIATICA PRESENT: ICD-10-CM

## 2024-01-02 DIAGNOSIS — H04.129 DRY EYE: ICD-10-CM

## 2024-01-02 DIAGNOSIS — R06.89 TROUBLE BREATHING: ICD-10-CM

## 2024-01-02 RX ORDER — POLYVINYL ALCOHOL 14 MG/ML
1 SOLUTION/ DROPS OPHTHALMIC 4 TIMES DAILY
Qty: 15 ML | Refills: 11 | Status: SHIPPED | OUTPATIENT
Start: 2024-01-02

## 2024-01-02 NOTE — TELEPHONE ENCOUNTER
----- Message from Stacy Peoples MA sent at 12/28/2023  2:14 PM EST -----  Regarding: FW: Prescription refills  Contact: [phone]    ----- Message -----  From: Belén Lock MD  Sent: 12/28/2023   7:20 AM EST  To: Stacy Peoples MA  Subject: FW: Prescription refills                         Ok, #30  ----- Message -----  From: Gurpreet Gerber CMA  Sent: 12/27/2023   6:57 AM EST  To: Belén Lock MD  Subject: FW: Prescription refills                           ----- Message -----  From: Sabina Pal  Sent: 12/26/2023   4:53 PM EST  To:  [place] [address] Clinical Support Staff  Subject: Prescription refills                             Sabina needs a new prescription for:   Oxycodon 5mg every 6 hours PRN        
Brittany stopped by. She wanted to know if Sabina is supposed to be on amlodipine still or not. Looks like it was discontinue by the hospital.   Also please send in oxycodone. I cannot call it in. I get an error when trying to pend medication.   
Daughter stated they havent been giving her any amlodipine and they havent been checking her BP because they dont have a provider order to check her BP   
David, pharmacist with [facility] - patient was just discharged from the hospital.  He is asking if she is still on amlodipine as this was not on her list of meds sent over from the hospital.  Please verify.       Phone # [phone]  ext. [phone]    
I faxed a cover sheet to [employer] [employer] with request for daily BP check for 5 days.   
Trying to pend medication but it wont let me. States theres a more recent encounter. Please check other messages for pending med  
show

## 2024-01-12 RX ORDER — PEN NEEDLE, DIABETIC 31 GX5/16"
NEEDLE, DISPOSABLE MISCELLANEOUS
Qty: 1 EACH | Refills: 0 | Status: SHIPPED | OUTPATIENT
Start: 2024-01-12

## 2024-01-12 RX ORDER — OXYCODONE HYDROCHLORIDE 5 MG/1
5 TABLET ORAL EVERY 6 HOURS PRN
Qty: 30 TABLET | Refills: 0 | Status: CANCELLED | OUTPATIENT
Start: 2024-01-12

## 2024-01-12 RX ORDER — ALBUTEROL SULFATE 0.83 MG/ML
2.5 SOLUTION RESPIRATORY (INHALATION) 4 TIMES DAILY PRN
Qty: 75 ML | Refills: 1 | Status: SHIPPED | OUTPATIENT
Start: 2024-01-12 | End: 2024-02-22

## 2024-01-15 ENCOUNTER — PATIENT OUTREACH (OUTPATIENT)
Dept: CARE COORDINATION | Facility: CLINIC | Age: 84
End: 2024-01-15
Payer: MEDICARE

## 2024-01-15 NOTE — PROGRESS NOTES
Call placed regarding one month post discharge follow up call.  At time of outreach call the patient feels as if their condition has (improved) since initial visit with PCP or specialist.  Questions or concerns regarding recovery period addressed at this time.   Reviewed any PCP or specialists progress notes/labs/radiology reports if applicable and addressed any questions or concerns.  Kana Dale LPN

## 2024-01-17 ENCOUNTER — TELEPHONE (OUTPATIENT)
Dept: PRIMARY CARE | Facility: CLINIC | Age: 84
End: 2024-01-17
Payer: MEDICARE

## 2024-01-17 NOTE — TELEPHONE ENCOUNTER
Ethel King, PT with [facility] home care, left a msg stating that they need to re-cert the pt for therapy services due to recent hospitalizations and back problems.  PT will be 2x a week for 5 weeks, then 1x a week for 4 weeks. OT will be 1x a week for 8 weeks.

## 2024-01-23 NOTE — TELEPHONE ENCOUNTER
Ethel, PT, left another msg asking for a verbal ok for therapy for this pt.  She is wanting to see her today.

## 2024-01-29 ENCOUNTER — OFFICE VISIT (OUTPATIENT)
Dept: PRIMARY CARE | Facility: CLINIC | Age: 84
End: 2024-01-29
Payer: MEDICARE

## 2024-01-29 ENCOUNTER — LAB (OUTPATIENT)
Dept: LAB | Facility: LAB | Age: 84
End: 2024-01-29
Payer: MEDICARE

## 2024-01-29 DIAGNOSIS — G30.1 MODERATE LATE ONSET ALZHEIMER'S DEMENTIA WITHOUT BEHAVIORAL DISTURBANCE, PSYCHOTIC DISTURBANCE, MOOD DISTURBANCE, OR ANXIETY (MULTI): ICD-10-CM

## 2024-01-29 DIAGNOSIS — F33.42 RECURRENT MAJOR DEPRESSIVE DISORDER, IN FULL REMISSION (CMS-HCC): ICD-10-CM

## 2024-01-29 DIAGNOSIS — R73.09 ELEVATED GLUCOSE: ICD-10-CM

## 2024-01-29 DIAGNOSIS — I73.9 CLAUDICATION, INTERMITTENT (CMS-HCC): ICD-10-CM

## 2024-01-29 DIAGNOSIS — F02.B0 MODERATE LATE ONSET ALZHEIMER'S DEMENTIA WITHOUT BEHAVIORAL DISTURBANCE, PSYCHOTIC DISTURBANCE, MOOD DISTURBANCE, OR ANXIETY (MULTI): ICD-10-CM

## 2024-01-29 DIAGNOSIS — J47.9 BRONCHIECTASIS, UNCOMPLICATED (MULTI): ICD-10-CM

## 2024-01-29 DIAGNOSIS — E03.9 HYPOTHYROIDISM, UNSPECIFIED TYPE: ICD-10-CM

## 2024-01-29 DIAGNOSIS — N18.31 STAGE 3A CHRONIC KIDNEY DISEASE (MULTI): Primary | ICD-10-CM

## 2024-01-29 DIAGNOSIS — J96.01 ACUTE RESPIRATORY FAILURE WITH HYPOXIA (MULTI): ICD-10-CM

## 2024-01-29 DIAGNOSIS — E66.01 CLASS 2 SEVERE OBESITY WITH SERIOUS COMORBIDITY AND BODY MASS INDEX (BMI) OF 35.0 TO 35.9 IN ADULT, UNSPECIFIED OBESITY TYPE (MULTI): ICD-10-CM

## 2024-01-29 DIAGNOSIS — G83.10 PARESIS OF LOWER EXTREMITY (MULTI): ICD-10-CM

## 2024-01-29 DIAGNOSIS — D33.3 RIGHT ACOUSTIC NEUROMA (MULTI): ICD-10-CM

## 2024-01-29 DIAGNOSIS — J41.0 SIMPLE CHRONIC BRONCHITIS (MULTI): ICD-10-CM

## 2024-01-29 DIAGNOSIS — M46.1 SACROILIITIS (CMS-HCC): ICD-10-CM

## 2024-01-29 DIAGNOSIS — N18.31 STAGE 3A CHRONIC KIDNEY DISEASE (MULTI): ICD-10-CM

## 2024-01-29 DIAGNOSIS — F03.B0 MODERATE DEMENTIA WITHOUT BEHAVIORAL DISTURBANCE, PSYCHOTIC DISTURBANCE, MOOD DISTURBANCE, OR ANXIETY, UNSPECIFIED DEMENTIA TYPE (MULTI): ICD-10-CM

## 2024-01-29 LAB
ALT SERPL W P-5'-P-CCNC: 30 U/L (ref 7–45)
ANION GAP SERPL CALC-SCNC: 15 MMOL/L (ref 10–20)
BUN SERPL-MCNC: 21 MG/DL (ref 6–23)
CALCIUM SERPL-MCNC: 9.4 MG/DL (ref 8.6–10.3)
CHLORIDE SERPL-SCNC: 106 MMOL/L (ref 98–107)
CO2 SERPL-SCNC: 26 MMOL/L (ref 21–32)
CREAT SERPL-MCNC: 1.15 MG/DL (ref 0.5–1.05)
EGFRCR SERPLBLD CKD-EPI 2021: 48 ML/MIN/1.73M*2
ERYTHROCYTE [DISTWIDTH] IN BLOOD BY AUTOMATED COUNT: 14.3 % (ref 11.5–14.5)
GLUCOSE SERPL-MCNC: 103 MG/DL (ref 74–99)
HCT VFR BLD AUTO: 48.3 % (ref 36–46)
HGB BLD-MCNC: 14.7 G/DL (ref 12–16)
MCH RBC QN AUTO: 29.6 PG (ref 26–34)
MCHC RBC AUTO-ENTMCNC: 30.4 G/DL (ref 32–36)
MCV RBC AUTO: 97 FL (ref 80–100)
NRBC BLD-RTO: 0 /100 WBCS (ref 0–0)
PLATELET # BLD AUTO: 279 X10*3/UL (ref 150–450)
POTASSIUM SERPL-SCNC: 4.7 MMOL/L (ref 3.5–5.3)
RBC # BLD AUTO: 4.96 X10*6/UL (ref 4–5.2)
SODIUM SERPL-SCNC: 142 MMOL/L (ref 136–145)
TSH SERPL-ACNC: 0.86 MIU/L (ref 0.44–3.98)
WBC # BLD AUTO: 9.6 X10*3/UL (ref 4.4–11.3)

## 2024-01-29 PROCEDURE — 36415 COLL VENOUS BLD VENIPUNCTURE: CPT

## 2024-01-29 PROCEDURE — 1124F ACP DISCUSS-NO DSCNMKR DOCD: CPT | Performed by: INTERNAL MEDICINE

## 2024-01-29 PROCEDURE — 1036F TOBACCO NON-USER: CPT | Performed by: INTERNAL MEDICINE

## 2024-01-29 PROCEDURE — 84460 ALANINE AMINO (ALT) (SGPT): CPT

## 2024-01-29 PROCEDURE — 1160F RVW MEDS BY RX/DR IN RCRD: CPT | Performed by: INTERNAL MEDICINE

## 2024-01-29 PROCEDURE — 1126F AMNT PAIN NOTED NONE PRSNT: CPT | Performed by: INTERNAL MEDICINE

## 2024-01-29 PROCEDURE — 1157F ADVNC CARE PLAN IN RCRD: CPT | Performed by: INTERNAL MEDICINE

## 2024-01-29 PROCEDURE — 85027 COMPLETE CBC AUTOMATED: CPT

## 2024-01-29 PROCEDURE — 3075F SYST BP GE 130 - 139MM HG: CPT | Performed by: INTERNAL MEDICINE

## 2024-01-29 PROCEDURE — 84443 ASSAY THYROID STIM HORMONE: CPT

## 2024-01-29 PROCEDURE — 3079F DIAST BP 80-89 MM HG: CPT | Performed by: INTERNAL MEDICINE

## 2024-01-29 PROCEDURE — 80048 BASIC METABOLIC PNL TOTAL CA: CPT

## 2024-01-29 PROCEDURE — 83036 HEMOGLOBIN GLYCOSYLATED A1C: CPT

## 2024-01-29 PROCEDURE — 99214 OFFICE O/P EST MOD 30 MIN: CPT | Performed by: INTERNAL MEDICINE

## 2024-01-29 PROCEDURE — 1159F MED LIST DOCD IN RCRD: CPT | Performed by: INTERNAL MEDICINE

## 2024-01-29 ASSESSMENT — PATIENT HEALTH QUESTIONNAIRE - PHQ9
2. FEELING DOWN, DEPRESSED OR HOPELESS: NEARLY EVERY DAY
SUM OF ALL RESPONSES TO PHQ9 QUESTIONS 1 AND 2: 6
1. LITTLE INTEREST OR PLEASURE IN DOING THINGS: NEARLY EVERY DAY

## 2024-01-29 NOTE — PROGRESS NOTES
"Subjective   Patient ID: Sabina Pal is a 83 y.o. female who presents for f/u       HPI     Recently moved to different assisted living.  Considering memory care.  Overall well however cognition declining.  Denies any recent injuries although did fall.  No chest pain.  No shortness of breath.  Ongoing issues with right-sided back and sciatic pain.  Working with pain medicine.  Injections underway.  Remains on low-dose oxycodone as needed.  Increase sleeping.  Appetite fair.  From last note:  Hospital 12 1-12 13.  Discharge summary reviewed.  \"Hospital Course: Sabina is a 82 y.o. female with past medical history of S hypertension, hyperlipidemia, hypothyroidism, dementia, sjogren's syndrome on prednisone 7.5 mg daily, CKD stage II, chronic back pain with occasional flares of sciatica presented from assisted living with worsening low back pain. X-ray lumbar spine negative for fractures. She was found to be hypoxic in the ED. Respiratory viral panel negative. CT abdomen no acute process showed bibasilar opacities, atelectasis. Chest x-ray showed cardiomegaly with pulmonary venous congestion. Admitted for further management. Patient later on became acutely hypoxic and had to be transferred to the ICU. She was placed on NIV and high flow oxygen. She underwent bronchoscopy and noted to have severe tracheobronchomalacia. Cultures from BAL showed MRSA. She was started on broad-spectrum antibiotics. She was transferred out of ICU. Respiratory status continues to improve. Seen by GI and underwent EGD on 12/11/23 which showed patient having candida esophagitis. See discharge diagnoses list above and medication adjustments below in med rec. The patient is discharged in improved and stable condition.     Review of Systems    Objective   There were no vitals taken for this visit.    Physical Exam  Alert.  Confused.  Smiling.  Lungs clear bilaterally.  Heart regular.  Abdomen soft.    Lab Results   Component Value Date    WBC 5.6 " 05/28/2022    HGB 14.1 05/28/2022    HCT 44.7 05/28/2022     05/28/2022    CHOL 219 (H) 12/02/2021    TRIG 154 (H) 12/02/2021    HDL 69.7 12/02/2021    ALT 17 05/28/2022    AST 20 05/28/2022     05/28/2022    K 3.9 05/28/2022     05/28/2022    CREATININE 0.90 05/28/2022    BUN 19 05/28/2022    CO2 25 05/28/2022    TSH 5.98 (H) 05/28/2022    INR 0.9 05/28/2022    HGBA1C 6.0 (A) 10/05/2022      Assessment/Plan       #1 vocal cord lesion-resolved, remote issue. Followup ear nose and throat prn  #2 shoulder pain- stable/improved  #3 hypothyroidism-stable followup with endocrinology(dr mascorro).  Check TSH  #4 elevated creatinine- stable/subtle. suspect she has stage 3 chronic kidney disease. will con't to follow. Order to patient for labs  #5 bronchiectasis- stable on low- dose Advair. Followup pulmonology. con't advair.   #6 osteopenia- stable. Followup rheumatology- con't Rx.  #7 hypertension- good. Continue treatment. Stay focused on low salt diet with exercise  #8 obstructive sleep apnea- refuses treating. Reviewed Ent EVAL.  SHE DECLINES RX.   #9 degenerative joint disease/inflammatory arthritis/sjogren's/sacroiliitis- followup rheumatology. low-dose prednisone.   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 f/u ENT.  #11 anxiety- good, con't. Very supportive family. Encouraged grief group. con't paroxetine to 20 mg. Consider reducing dose next vsit. Reviewed use and side effects  #12 palps- resolved  #13 imbalance- reviewed. Discussed safety issues. . use walker. f/u ENT. con't to use walker.  #14 insomnia- better.   #15 back pain- s/p MRI.  better, appt pending w/ pain med   #16  Dementia-slowly progressing.  Follow-up geriatrics. Reviewed.Family has decided to not use Aricept given concern over QTc prolongation reviewed by pharmacist.  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 No sign of trauma  #20 possible type II MI-status post cardiology evaluation. Patient stress  test normal. No recommendations made. I do not have these results. We will try and obtain.  #21 LBP- better.  Still w/ difficulty getting out of a chair.  Working w/ PT.  Rec Lift-chair to asist w/ ADLS.  Con't norco every day.  Reviewed risks.   #22 pna- DOING WELL. f/u  Pulm at [facility] ([facility])  #23 candidal esophagitis- swallowing better, on rx, f/u  GI at [facility]  PRN.       Had a long discussion with patient and daughters regarding code issues DNR status etc.     Patient was identified as a fall risk. Risk prevention instructions provided.  Follow-up 3 months

## 2024-01-30 VITALS
TEMPERATURE: 97.9 F | DIASTOLIC BLOOD PRESSURE: 80 MMHG | SYSTOLIC BLOOD PRESSURE: 130 MMHG | BODY MASS INDEX: 29.62 KG/M2 | WEIGHT: 167.2 LBS

## 2024-01-30 PROBLEM — J96.01 ACUTE RESPIRATORY FAILURE WITH HYPOXIA (MULTI): Status: ACTIVE | Noted: 2024-01-30

## 2024-01-30 PROBLEM — E66.01 CLASS 2 SEVERE OBESITY WITH SERIOUS COMORBIDITY AND BODY MASS INDEX (BMI) OF 35.0 TO 35.9 IN ADULT, UNSPECIFIED OBESITY TYPE (MULTI): Status: ACTIVE | Noted: 2024-01-30

## 2024-01-30 PROBLEM — J41.0 SIMPLE CHRONIC BRONCHITIS (MULTI): Status: ACTIVE | Noted: 2024-01-30

## 2024-01-30 PROBLEM — G83.10 PARESIS OF LOWER EXTREMITY (MULTI): Status: ACTIVE | Noted: 2024-01-30

## 2024-01-30 LAB
EST. AVERAGE GLUCOSE BLD GHB EST-MCNC: 128 MG/DL
HBA1C MFR BLD: 6.1 %

## 2024-02-09 ENCOUNTER — OFFICE VISIT (OUTPATIENT)
Dept: RHEUMATOLOGY | Facility: CLINIC | Age: 84
End: 2024-02-09
Payer: MEDICARE

## 2024-02-09 VITALS
WEIGHT: 180 LBS | SYSTOLIC BLOOD PRESSURE: 104 MMHG | DIASTOLIC BLOOD PRESSURE: 66 MMHG | BODY MASS INDEX: 31.89 KG/M2 | HEART RATE: 83 BPM | HEIGHT: 63 IN | OXYGEN SATURATION: 93 %

## 2024-02-09 DIAGNOSIS — M35.00 SJOGREN'S SYNDROME, WITH UNSPECIFIED ORGAN INVOLVEMENT (MULTI): ICD-10-CM

## 2024-02-09 DIAGNOSIS — M81.0 AGE RELATED OSTEOPOROSIS, UNSPECIFIED PATHOLOGICAL FRACTURE PRESENCE: ICD-10-CM

## 2024-02-09 DIAGNOSIS — M19.011 LOCALIZED OSTEOARTHRITIS OF RIGHT SHOULDER: Primary | ICD-10-CM

## 2024-02-09 PROCEDURE — 20610 DRAIN/INJ JOINT/BURSA W/O US: CPT | Performed by: INTERNAL MEDICINE

## 2024-02-09 PROCEDURE — 3074F SYST BP LT 130 MM HG: CPT | Performed by: INTERNAL MEDICINE

## 2024-02-09 PROCEDURE — 99214 OFFICE O/P EST MOD 30 MIN: CPT | Performed by: INTERNAL MEDICINE

## 2024-02-09 PROCEDURE — 2500000004 HC RX 250 GENERAL PHARMACY W/ HCPCS (ALT 636 FOR OP/ED): Performed by: INTERNAL MEDICINE

## 2024-02-09 PROCEDURE — 1160F RVW MEDS BY RX/DR IN RCRD: CPT | Performed by: INTERNAL MEDICINE

## 2024-02-09 PROCEDURE — 1157F ADVNC CARE PLAN IN RCRD: CPT | Performed by: INTERNAL MEDICINE

## 2024-02-09 PROCEDURE — 1159F MED LIST DOCD IN RCRD: CPT | Performed by: INTERNAL MEDICINE

## 2024-02-09 PROCEDURE — 1126F AMNT PAIN NOTED NONE PRSNT: CPT | Performed by: INTERNAL MEDICINE

## 2024-02-09 PROCEDURE — 1036F TOBACCO NON-USER: CPT | Performed by: INTERNAL MEDICINE

## 2024-02-09 PROCEDURE — 3078F DIAST BP <80 MM HG: CPT | Performed by: INTERNAL MEDICINE

## 2024-02-09 RX ADMIN — TRIAMCINOLONE ACETONIDE 40 MG: 40 INJECTION, SUSPENSION INTRA-ARTICULAR; INTRAMUSCULAR at 11:04

## 2024-02-09 ASSESSMENT — ENCOUNTER SYMPTOMS
SLEEP DISTURBANCE: 0
ABDOMINAL PAIN: 0
OCCASIONAL FEELINGS OF UNSTEADINESS: 1
FATIGUE: 1
SHORTNESS OF BREATH: 0
LOSS OF SENSATION IN FEET: 0
DEPRESSION: 0

## 2024-02-09 ASSESSMENT — PATIENT HEALTH QUESTIONNAIRE - PHQ9
1. LITTLE INTEREST OR PLEASURE IN DOING THINGS: NOT AT ALL
2. FEELING DOWN, DEPRESSED OR HOPELESS: NOT AT ALL
SUM OF ALL RESPONSES TO PHQ9 QUESTIONS 1 AND 2: 0

## 2024-02-09 NOTE — PROGRESS NOTES
Subjective   Patient ID: Sabina Pal is a 83 y.o. female who presents for Follow-up.  HPI  Patient with history of Sjogren's with negative CARMEN/SHARONDA, osteoarthritis, osteoporosis on chronic prednisone.  Patient is accompanied by her daughter today    At her last visit in October I had injected her shoulder and also Prolia.  She was continued on Plaquenil.    She did have a fall at her nursing home and was in the emergency room.  They were uncertain how she fell but fell on her left shoulder.  There was no evidence of fracture or dislocation but advanced degenerative changes of the acromioclavicular joint and the glenohumeral joints with joint space loss, subchondral sclerosis, and periarticular osteophytes.  There was a chronic fracture deformity of the right lateral third rib similar to previous x-rays.    Here for another injection.  Review of Systems   Constitutional:  Positive for fatigue.   HENT:  Positive for hearing loss.    Respiratory:  Negative for shortness of breath.    Cardiovascular:  Negative for chest pain.   Gastrointestinal:  Negative for abdominal pain.   Musculoskeletal:         Per HPI   Skin:  Negative for rash.   Psychiatric/Behavioral:  Negative for sleep disturbance.        Objective   Physical Exam  GEN: NAD A&O x3 appropriate affect  HENT:MMM, no oral or nasal ulcers,PERRLA, no enlarged glands or thyroid  EYES:  no conjunctival redness, eyelids normal  LYMPH: no cervical or axillary lymphadenopathy  CV: RRR, no MRG, no lower extremity edema  RESP: CTA B/L with normal respiratory effort and no intercostal retractions  ABD: Soft non-tender non-distended, no hepatosplenomegaly  NEURO: 5/5 strength upper and lower extremities, DTR +2 bicep and patellar tendons  SKIN: no rashes, ulcers, or subcutaneous nodules  PULSES: +radials  TENDER POINTS: 0/18   MSK:    Assessment/Plan        Osteoporosis - On chronic prednisone.  Has been on Prolia since August 2020.     -DEXA 11/13/2023 showed T-score  left total hip -0.9, left femoral neck -2.3, left forearm total -1.1, has had improvement compared to previous bone density in 2021.   -Will have her continue with Prolia.  She will be due again in April 2024    Osteoarthritis and previous diagnosis of Sjogren's with negative CARMEN/SHARONDA panel.  Has been on hydroxychloroquine.      Right shoulder pain injected this laterally with 40 mg of Kenalog.      Patient ID: Sabina Pal is a 83 y.o. female.    Large Joint Injection/Arthrocentesis: R subacromial bursa on 2/9/2024 11:04 AM  Indications: pain  Details: 25 G needle, lateral approach  Medications: 40 mg triamcinolone acetonide 40 mg/mL

## 2024-02-12 RX ORDER — TRIAMCINOLONE ACETONIDE 40 MG/ML
40 INJECTION, SUSPENSION INTRA-ARTICULAR; INTRAMUSCULAR
Status: COMPLETED | OUTPATIENT
Start: 2024-02-09 | End: 2024-02-09

## 2024-02-22 ENCOUNTER — OFFICE VISIT (OUTPATIENT)
Dept: PRIMARY CARE | Facility: CLINIC | Age: 84
End: 2024-02-22
Payer: MEDICARE

## 2024-02-22 ENCOUNTER — TELEPHONE (OUTPATIENT)
Dept: PRIMARY CARE | Facility: CLINIC | Age: 84
End: 2024-02-22

## 2024-02-22 VITALS
DIASTOLIC BLOOD PRESSURE: 80 MMHG | BODY MASS INDEX: 28.87 KG/M2 | SYSTOLIC BLOOD PRESSURE: 130 MMHG | TEMPERATURE: 97.9 F | WEIGHT: 163 LBS

## 2024-02-22 DIAGNOSIS — F33.41 RECURRENT MAJOR DEPRESSIVE DISORDER, IN PARTIAL REMISSION (CMS-HCC): ICD-10-CM

## 2024-02-22 DIAGNOSIS — M81.0 AGE RELATED OSTEOPOROSIS, UNSPECIFIED PATHOLOGICAL FRACTURE PRESENCE: ICD-10-CM

## 2024-02-22 DIAGNOSIS — D33.3 BENIGN NEOPLASM OF CRANIAL NERVES (MULTI): ICD-10-CM

## 2024-02-22 DIAGNOSIS — E03.9 HYPOTHYROIDISM, UNSPECIFIED TYPE: ICD-10-CM

## 2024-02-22 DIAGNOSIS — N18.31 STAGE 3A CHRONIC KIDNEY DISEASE (MULTI): ICD-10-CM

## 2024-02-22 DIAGNOSIS — E78.2 MIXED HYPERLIPIDEMIA: Primary | ICD-10-CM

## 2024-02-22 DIAGNOSIS — N18.2 CHRONIC KIDNEY DISEASE (CKD), STAGE II (MILD): ICD-10-CM

## 2024-02-22 DIAGNOSIS — I10 BENIGN ESSENTIAL HYPERTENSION: ICD-10-CM

## 2024-02-22 PROCEDURE — 1159F MED LIST DOCD IN RCRD: CPT | Performed by: INTERNAL MEDICINE

## 2024-02-22 PROCEDURE — 1157F ADVNC CARE PLAN IN RCRD: CPT | Performed by: INTERNAL MEDICINE

## 2024-02-22 PROCEDURE — 1160F RVW MEDS BY RX/DR IN RCRD: CPT | Performed by: INTERNAL MEDICINE

## 2024-02-22 PROCEDURE — 3079F DIAST BP 80-89 MM HG: CPT | Performed by: INTERNAL MEDICINE

## 2024-02-22 PROCEDURE — 3075F SYST BP GE 130 - 139MM HG: CPT | Performed by: INTERNAL MEDICINE

## 2024-02-22 PROCEDURE — 1123F ACP DISCUSS/DSCN MKR DOCD: CPT | Performed by: INTERNAL MEDICINE

## 2024-02-22 PROCEDURE — 1158F ADVNC CARE PLAN TLK DOCD: CPT | Performed by: INTERNAL MEDICINE

## 2024-02-22 PROCEDURE — 1036F TOBACCO NON-USER: CPT | Performed by: INTERNAL MEDICINE

## 2024-02-22 PROCEDURE — 99214 OFFICE O/P EST MOD 30 MIN: CPT | Performed by: INTERNAL MEDICINE

## 2024-02-22 PROCEDURE — 1126F AMNT PAIN NOTED NONE PRSNT: CPT | Performed by: INTERNAL MEDICINE

## 2024-02-22 ASSESSMENT — PATIENT HEALTH QUESTIONNAIRE - PHQ9
SUM OF ALL RESPONSES TO PHQ9 QUESTIONS 1 AND 2: 0
1. LITTLE INTEREST OR PLEASURE IN DOING THINGS: NOT AT ALL
2. FEELING DOWN, DEPRESSED OR HOPELESS: NOT AT ALL

## 2024-02-22 NOTE — PROGRESS NOTES
Subjective   Patient ID: Sabina Pal is a 83 y.o. female who presents for medication review.    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 Followup ear nose and throat prn  #2 shoulder pain- stable/improved  #3 hypothyroidism-stable followup with endocrinology(dr mascorro).  Check TSH  #4 elevated creatinine- stable/subtle. suspect she has stage 3 chronic kidney disease. will con't to follow. Order to patient for labs  #5 bronchiectasis- stable on low- dose Advair. Followup pulmonology. con't advair.   #6 osteopenia- stable. Followup rheumatology- con't Rx.  #7 hypertension- good. Continue treatment. Stay focused on low salt diet with exercise  #8 obstructive sleep apnea- refuses treating. Reviewed Ent EVAL.  SHE DECLINES RX.   #9 degenerative joint disease/inflammatory arthritis/sjogren's/sacroiliitis- followup rheumatology. low-dose prednisone.   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 f/u ENT.  #11 anxiety- good, con't. Very supportive family. Encouraged grief group. con't paroxetine to 20 mg. Consider reducing dose next vsit. Reviewed use and side effects  #12 palps- resolved  #13 imbalance- reviewed. Discussed safety issues. . use walker. f/u ENT. con't to use walker.  #14 insomnia- better.   #15 back pain- s/p MRI.  better, appt pending w/ pain med   #16  Dementia-slowly  progressing.  Follow-up geriatrics. Reviewed.Family has decided to not use Aricept given concern over QTc prolongation reviewed by pharmacist.  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 No sign of trauma  #20 possible type II MI-status post cardiology evaluation. Patient stress test normal. No recommendations made. I do not have these results. We will try and obtain.  #21 LBP- better.  Still w/ difficulty getting out of a chair.  Working w/ PT.  Rec Lift-chair to asist w/ ADLS.  Con't norco every day.  Reviewed risks.   #22 pna- DOING WELL. f/u  Pulm at [facility] ([facility])  #23 candidal esophagitis- swallowing better, on rx, f/u  GI at [facility]  PRN.       On ppi per gi-- esophagitis  Follws w/ rheum-- osteo/Ra  Change mucinex to prn  Stop lidicain, claritan, melaton    Had a long discussion with patient and daughters regarding code issues DNR status etc.     Patient was identified as a fall risk. Risk prevention instructions provided.  Follow-up 3 months

## 2024-02-22 NOTE — TELEPHONE ENCOUNTER
Ethel Vargas, [facility], left a msg asking for the phys clearance paperwork that they faxed over to us.  The pt is moving into the assisted living this Saturday and they need the paperwork prior to that.

## 2024-02-23 PROBLEM — I21.9 MYOCARDIAL INFARCTION (MULTI): Status: RESOLVED | Noted: 2023-03-27 | Resolved: 2024-02-23

## 2024-02-23 PROBLEM — N18.1 CHRONIC KIDNEY DISEASE (CKD), STAGE I: Status: RESOLVED | Noted: 2023-03-27 | Resolved: 2024-02-23

## 2024-02-23 PROBLEM — E66.01 CLASS 2 SEVERE OBESITY WITH SERIOUS COMORBIDITY AND BODY MASS INDEX (BMI) OF 35.0 TO 35.9 IN ADULT, UNSPECIFIED OBESITY TYPE (MULTI): Status: RESOLVED | Noted: 2024-01-30 | Resolved: 2024-02-23

## 2024-02-23 PROBLEM — R79.89 ELEVATED TROPONIN: Status: RESOLVED | Noted: 2022-10-04 | Resolved: 2024-02-23

## 2024-02-23 PROBLEM — I73.9 CLAUDICATION, INTERMITTENT (CMS-HCC): Status: RESOLVED | Noted: 2023-03-27 | Resolved: 2024-02-23

## 2024-03-13 ENCOUNTER — PATIENT OUTREACH (OUTPATIENT)
Dept: CARE COORDINATION | Facility: CLINIC | Age: 84
End: 2024-03-13
Payer: MEDICARE

## 2024-03-14 ENCOUNTER — TELEPHONE (OUTPATIENT)
Dept: PRIMARY CARE | Facility: CLINIC | Age: 84
End: 2024-03-14
Payer: MEDICARE

## 2024-03-14 NOTE — TELEPHONE ENCOUNTER
Ethel King, Pt with [facility], left a msg looking to extend the pt's PT orders.  She would like to see the pt  2x/wk for 4 wks, then 1x/wk for 5 wks.  Sabina seems to be doing well in her new environment.  Ph: [phone]

## 2024-03-19 ENCOUNTER — TELEPHONE (OUTPATIENT)
Dept: PRIMARY CARE | Facility: CLINIC | Age: 84
End: 2024-03-19
Payer: MEDICARE

## 2024-03-19 DIAGNOSIS — M54.50 LOW BACK PAIN, UNSPECIFIED BACK PAIN LATERALITY, UNSPECIFIED CHRONICITY, UNSPECIFIED WHETHER SCIATICA PRESENT: ICD-10-CM

## 2024-03-19 RX ORDER — OXYCODONE HYDROCHLORIDE 5 MG/1
5 TABLET ORAL EVERY 6 HOURS PRN
Qty: 15 TABLET | Refills: 0 | Status: SHIPPED | OUTPATIENT
Start: 2024-03-19 | End: 2024-04-22 | Stop reason: SDUPTHER

## 2024-03-19 NOTE — TELEPHONE ENCOUNTER
Jada, nurse with [facility], left a msg asking for a refill of the pt's Oxycodone 5 mg, PRN every 6 hrs as needed.  Pharm is [facility].

## 2024-04-16 DIAGNOSIS — M54.50 LOW BACK PAIN, UNSPECIFIED BACK PAIN LATERALITY, UNSPECIFIED CHRONICITY, UNSPECIFIED WHETHER SCIATICA PRESENT: ICD-10-CM

## 2024-04-16 RX ORDER — OXYCODONE HYDROCHLORIDE 5 MG/1
5 TABLET ORAL EVERY 6 HOURS PRN
Qty: 15 TABLET | Refills: 0 | Status: CANCELLED | OUTPATIENT
Start: 2024-04-16

## 2024-04-22 DIAGNOSIS — M54.50 LOW BACK PAIN, UNSPECIFIED BACK PAIN LATERALITY, UNSPECIFIED CHRONICITY, UNSPECIFIED WHETHER SCIATICA PRESENT: ICD-10-CM

## 2024-04-22 RX ORDER — OXYCODONE HYDROCHLORIDE 5 MG/1
5 TABLET ORAL EVERY 6 HOURS PRN
Qty: 15 TABLET | Refills: 0 | Status: SHIPPED | OUTPATIENT
Start: 2024-04-22 | End: 2024-06-11

## 2024-04-29 ENCOUNTER — TELEMEDICINE (OUTPATIENT)
Dept: PRIMARY CARE | Facility: CLINIC | Age: 84
End: 2024-04-29
Payer: MEDICARE

## 2024-04-29 DIAGNOSIS — N18.2 CHRONIC KIDNEY DISEASE (CKD), STAGE II (MILD): ICD-10-CM

## 2024-04-29 DIAGNOSIS — E78.2 MIXED HYPERLIPIDEMIA: ICD-10-CM

## 2024-04-29 DIAGNOSIS — I10 BENIGN ESSENTIAL HYPERTENSION: Primary | ICD-10-CM

## 2024-04-29 PROCEDURE — 1157F ADVNC CARE PLAN IN RCRD: CPT | Performed by: INTERNAL MEDICINE

## 2024-04-29 PROCEDURE — 1159F MED LIST DOCD IN RCRD: CPT | Performed by: INTERNAL MEDICINE

## 2024-04-29 PROCEDURE — 1036F TOBACCO NON-USER: CPT | Performed by: INTERNAL MEDICINE

## 2024-04-29 PROCEDURE — 99213 OFFICE O/P EST LOW 20 MIN: CPT | Performed by: INTERNAL MEDICINE

## 2024-04-29 PROCEDURE — 1160F RVW MEDS BY RX/DR IN RCRD: CPT | Performed by: INTERNAL MEDICINE

## 2024-04-29 NOTE — PROGRESS NOTES
Subjective   Patient ID: Sabina Pal is a 83 y.o. female who presents for No chief complaint on file..    HPI      #1 vocal cord lesion-resolved   #2 shoulder pain- stable/improved  #3 hypothyroidism   #4 elevated creatinine- no issues.  #5 bronchiectasis-  no issues recently.     No falls.  Pain controlled  Bowels ok  Mood good    Review of Systems    Objective   There were no vitals taken for this visit.    Physical Exam      Lab Results   Component Value Date    WBC 9.6 01/29/2024    HGB 14.7 01/29/2024    HCT 48.3 (H) 01/29/2024     01/29/2024    CHOL 219 (H) 12/02/2021    TRIG 154 (H) 12/02/2021    HDL 69.7 12/02/2021    ALT 30 01/29/2024    AST 20 05/28/2022     01/29/2024    K 4.7 01/29/2024     01/29/2024    CREATININE 1.15 (H) 01/29/2024    BUN 21 01/29/2024    CO2 26 01/29/2024    TSH 0.86 01/29/2024    INR 0.9 05/28/2022    HGBA1C 6.1 (H) 01/29/2024 4/25/24 -->cr=0.9    Assessment/Plan   Problem List Items Addressed This Visit    None         #1 vocal cord lesion-resolved, remote issue. Followup ear nose and throat prn  #2 shoulder pain- stable/improved  #3 hypothyroidism-stable followup with endocrinology(dr mascorro).  Check TSH  #4 elevated creatinine- stable/subtle. suspect she has stage 3 chronic kidney disease. will con't to follow. Order to patient for labs  #5 bronchiectasis- stable on low- dose Advair. Followup pulmonology. con't advair.   #6 osteopenia- stable. Followup rheumatology- con't Rx.  #7 hypertension- good. Continue treatment. Stay focused on low salt diet with exercise  #8 obstructive sleep apnea- refuses treating. Reviewed Ent EVAL.  SHE DECLINES RX.   #9 degenerative joint disease/inflammatory arthritis/sjogren's/sacroiliitis- followup rheumatology. low-dose prednisone.   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 f/u ENT.  #11 anxiety- good, con't. Very supportive family. Encouraged grief group. con't paroxetine to 20 mg. Consider reducing dose next vsit. Reviewed  use and side effects  #12 palps- resolved  #13 imbalance- reviewed. Discussed safety issues. . use walker. f/u ENT. con't to use walker.  #14 insomnia- better.   #15 back pain- s/p MRI.  better, appt pending w/ pain med   #16  Dementia-slowly progressing.  Follow-up geriatrics. Reviewed.Family has decided to not use Aricept given concern over QTc prolongation reviewed by pharmacist.  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 No sign of trauma  #20 possible type II MI-status post cardiology evaluation. Patient stress test normal. No recommendations made. I do not have these results. We will try and obtain.  #21 LBP- better.  Still w/ difficulty getting out of a chair.  Working w/ PT.  Rec Lift-chair to asist w/ ADLS.  Con't norco every day.  Reviewed risks.   #22 pna- DOING WELL. f/u  Pulm at [facility] ([facility])  #23 candidal esophagitis- swallowing better, on rx, f/u  GI at [facility]  PRN.       On ppi per gi-- esophagitis  Follws w/ rheum-- osteo/Ra      Follow-up 3 months

## 2024-05-10 ENCOUNTER — OFFICE VISIT (OUTPATIENT)
Dept: RHEUMATOLOGY | Facility: CLINIC | Age: 84
End: 2024-05-10
Payer: MEDICARE

## 2024-05-10 VITALS
OXYGEN SATURATION: 95 % | HEART RATE: 85 BPM | BODY MASS INDEX: 30.19 KG/M2 | DIASTOLIC BLOOD PRESSURE: 76 MMHG | SYSTOLIC BLOOD PRESSURE: 136 MMHG | WEIGHT: 170.4 LBS | HEIGHT: 63 IN

## 2024-05-10 DIAGNOSIS — M19.011 LOCALIZED OSTEOARTHRITIS OF RIGHT SHOULDER: Primary | ICD-10-CM

## 2024-05-10 DIAGNOSIS — M81.0 AGE RELATED OSTEOPOROSIS, UNSPECIFIED PATHOLOGICAL FRACTURE PRESENCE: ICD-10-CM

## 2024-05-10 PROCEDURE — 1159F MED LIST DOCD IN RCRD: CPT | Performed by: INTERNAL MEDICINE

## 2024-05-10 PROCEDURE — 1157F ADVNC CARE PLAN IN RCRD: CPT | Performed by: INTERNAL MEDICINE

## 2024-05-10 PROCEDURE — 1160F RVW MEDS BY RX/DR IN RCRD: CPT | Performed by: INTERNAL MEDICINE

## 2024-05-10 PROCEDURE — 99213 OFFICE O/P EST LOW 20 MIN: CPT | Performed by: INTERNAL MEDICINE

## 2024-05-10 PROCEDURE — 96372 THER/PROPH/DIAG INJ SC/IM: CPT | Performed by: INTERNAL MEDICINE

## 2024-05-10 PROCEDURE — 3078F DIAST BP <80 MM HG: CPT | Performed by: INTERNAL MEDICINE

## 2024-05-10 PROCEDURE — 1125F AMNT PAIN NOTED PAIN PRSNT: CPT | Performed by: INTERNAL MEDICINE

## 2024-05-10 PROCEDURE — 3075F SYST BP GE 130 - 139MM HG: CPT | Performed by: INTERNAL MEDICINE

## 2024-05-10 PROCEDURE — 2500000004 HC RX 250 GENERAL PHARMACY W/ HCPCS (ALT 636 FOR OP/ED): Mod: JZ | Performed by: INTERNAL MEDICINE

## 2024-05-10 RX ORDER — TRIAMCINOLONE ACETONIDE 40 MG/ML
40 INJECTION, SUSPENSION INTRA-ARTICULAR; INTRAMUSCULAR
Status: COMPLETED | OUTPATIENT
Start: 2024-05-10 | End: 2024-05-10

## 2024-05-10 RX ADMIN — DENOSUMAB 60 MG: 60 INJECTION SUBCUTANEOUS at 10:34

## 2024-05-10 RX ADMIN — TRIAMCINOLONE ACETONIDE 40 MG: 40 INJECTION, SUSPENSION INTRA-ARTICULAR; INTRAMUSCULAR at 10:33

## 2024-05-10 ASSESSMENT — ENCOUNTER SYMPTOMS
FATIGUE: 1
SLEEP DISTURBANCE: 0
SHORTNESS OF BREATH: 0
ABDOMINAL PAIN: 0

## 2024-05-10 ASSESSMENT — PAIN SCALES - GENERAL: PAINLEVEL: 6

## 2024-05-10 NOTE — PROGRESS NOTES
Subjective   Patient ID: Sabina Pal is a 83 y.o. female who presents for Follow-up (prolia).  HPI  Patient with history of Sjogren's with negative CARMEN/SHARONDA, osteoarthritis, osteoporosis on chronic prednisone.    Patient is accompanied by her daughter today  Patient was last seen in February And we had injected her right shoulder.  Had more pain after a fall.    According to her daughter she tends to complain frequently about her shoulders more on the right than the left.  Patient says she has a chronic ache in her shoulder.  Denies any falls since we last saw her.  She seems to complain the most about her shoulders.  They do feel that she significantly has helped by the injection but other uncertain how long it lasted for.   Review of Systems   Constitutional:  Positive for fatigue.   HENT:  Positive for hearing loss.    Respiratory:  Negative for shortness of breath.    Cardiovascular:  Negative for chest pain.   Gastrointestinal:  Negative for abdominal pain.   Musculoskeletal:         Per HPI   Skin:  Negative for rash.   Psychiatric/Behavioral:  Negative for sleep disturbance.        Objective   Physical Exam  GEN: NAD A&O x3 appropriate affect  HENT:MMM, no oral or nasal ulcers,PERRLA, no enlarged glands or thyroid  EYES:  no conjunctival redness, eyelids normal  LYMPH: no cervical or axillary lymphadenopathy  CV: RRR, no MRG, no lower extremity edema  RESP: CTA B/L with normal respiratory effort and no intercostal retractions  ABD: Soft non-tender non-distended, no hepatosplenomegaly  NEURO: 5/5 strength upper and lower extremities, DTR +2 bicep and patellar tendons  SKIN: no rashes, ulcers, or subcutaneous nodules  PULSES: +radials  TENDER POINTS: 0/18   MSK:    Assessment/Plan        Osteoporosis - On chronic prednisone.  Has been on Prolia since August 2020.     -DEXA 11/13/2023 showed T-score left total hip -0.9, left femoral neck -2.3, left forearm total -1.1, has had improvement compared to previous  bone density in 2021.   -Will have her continue with Prolia.  Patient injected with Prolia today   -Reviewed her recent blood work which shows normal kidney and calcium levels.    Osteoarthritis and previous diagnosis of Sjogren's with negative CARMEN/SHARONDA panel.  Has been on hydroxychloroquine.      Right shoulder pain injected this laterally with 40 mg of Kenalog.      Patient ID: Sabina Pal is a 83 y.o. female.    Large Joint Injection/Arthrocentesis on 5/10/2024 10:33 AM  Indications: pain  Details: 25 G needle, lateral approach  Medications: 40 mg triamcinolone acetonide 40 mg/mL  Outcome: tolerated well, no immediate complications  Procedure, treatment alternatives, risks and benefits explained, specific risks discussed. Patient was prepped and draped in the usual sterile fashion.

## 2024-05-14 ENCOUNTER — TELEPHONE (OUTPATIENT)
Dept: PRIMARY CARE | Facility: CLINIC | Age: 84
End: 2024-05-14
Payer: MEDICARE

## 2024-05-14 NOTE — TELEPHONE ENCOUNTER
Ethel King, PT, left a msg looking to continue the pt's therapy since she had a fall.  She would like to see her 1x/wk for 9 weeks.  Please call with a verbal.  [phone].

## 2024-05-20 ENCOUNTER — TELEPHONE (OUTPATIENT)
Dept: PRIMARY CARE | Facility: CLINIC | Age: 84
End: 2024-05-20
Payer: MEDICARE

## 2024-05-20 NOTE — TELEPHONE ENCOUNTER
PT daughter calling, patient lives at [facility] - it is felt she may have pneumonia - nurse at home said patient's chest is very congested.  PT also has fever, chills and if achy.  DTR said ems will not take to ER as she is DNR - asking if she could possibly get a chest xray in order to start on medication.  There are no available appts right now with anyone.  BB said to check with you first as she has same day sick tomorrow.  Please advise if you will order xray or if she needs to come in to be see.

## 2024-05-20 NOTE — TELEPHONE ENCOUNTER
Relayed Dr. Lock's message to patient's daughter - they were on their way to ER and had to stop at [place] [employer] because she had gotten worse, patient is being transported to [facility].

## 2024-05-31 ENCOUNTER — TELEPHONE (OUTPATIENT)
Dept: PRIMARY CARE | Facility: CLINIC | Age: 84
End: 2024-05-31
Payer: MEDICARE

## 2024-06-08 DIAGNOSIS — M54.50 LOW BACK PAIN, UNSPECIFIED BACK PAIN LATERALITY, UNSPECIFIED CHRONICITY, UNSPECIFIED WHETHER SCIATICA PRESENT: ICD-10-CM

## 2024-06-11 RX ORDER — OXYCODONE HYDROCHLORIDE 5 MG/1
5 TABLET ORAL EVERY 6 HOURS PRN
Qty: 14 TABLET | Refills: 0 | Status: SHIPPED | OUTPATIENT
Start: 2024-06-11

## 2024-06-28 DIAGNOSIS — M54.50 LOW BACK PAIN, UNSPECIFIED BACK PAIN LATERALITY, UNSPECIFIED CHRONICITY, UNSPECIFIED WHETHER SCIATICA PRESENT: ICD-10-CM

## 2024-06-28 NOTE — TELEPHONE ENCOUNTER
[facility] is requesting a new refill on Oxycodone 5mg as needed for severe pain to [facility] pharmacy. # [phone]

## 2024-07-01 NOTE — TELEPHONE ENCOUNTER
Lizandro at [facility] called because patient only has 1 oxycodon 5mg left. Please send to [phone].

## 2024-07-05 RX ORDER — OXYCODONE HYDROCHLORIDE 5 MG/1
5 TABLET ORAL EVERY 6 HOURS PRN
Qty: 14 TABLET | Refills: 0 | Status: SHIPPED | OUTPATIENT
Start: 2024-07-05

## 2024-07-08 ENCOUNTER — TELEPHONE (OUTPATIENT)
Dept: PRIMARY CARE | Facility: CLINIC | Age: 84
End: 2024-07-08
Payer: MEDICARE

## 2024-07-08 ENCOUNTER — OFFICE VISIT (OUTPATIENT)
Dept: URGENT CARE | Facility: CLINIC | Age: 84
End: 2024-07-08
Payer: MEDICARE

## 2024-07-08 VITALS
TEMPERATURE: 98.1 F | SYSTOLIC BLOOD PRESSURE: 151 MMHG | OXYGEN SATURATION: 89 % | HEART RATE: 78 BPM | RESPIRATION RATE: 18 BRPM | DIASTOLIC BLOOD PRESSURE: 80 MMHG

## 2024-07-08 DIAGNOSIS — M70.21 OLECRANON BURSITIS OF RIGHT ELBOW: Primary | ICD-10-CM

## 2024-07-08 PROCEDURE — 3077F SYST BP >= 140 MM HG: CPT | Performed by: NURSE PRACTITIONER

## 2024-07-08 PROCEDURE — 3079F DIAST BP 80-89 MM HG: CPT | Performed by: NURSE PRACTITIONER

## 2024-07-08 PROCEDURE — 99203 OFFICE O/P NEW LOW 30 MIN: CPT | Performed by: NURSE PRACTITIONER

## 2024-07-08 PROCEDURE — 1157F ADVNC CARE PLAN IN RCRD: CPT | Performed by: NURSE PRACTITIONER

## 2024-07-08 NOTE — PROGRESS NOTES
Subjective   Patient ID: Sabina Pal is a 83 y.o. female.      Elbow Pain      The following portions of the chart were reviewed this encounter and updated as appropriate:  Allergies         Review of Systems   All other systems reviewed and are negative.    Objective   Physical Exam  Vitals and nursing note reviewed.   Constitutional:       General: She is not in acute distress.     Appearance: Normal appearance. She is not toxic-appearing.   HENT:      Head: Normocephalic.      Right Ear: External ear normal.      Left Ear: External ear normal.      Nose: Nose normal.      Mouth/Throat:      Mouth: Mucous membranes are moist.      Pharynx: No oropharyngeal exudate or posterior oropharyngeal erythema.   Eyes:      Extraocular Movements: Extraocular movements intact.   Cardiovascular:      Rate and Rhythm: Normal rate and regular rhythm.      Heart sounds: Normal heart sounds.   Pulmonary:      Effort: Pulmonary effort is normal.      Breath sounds: Normal breath sounds. No wheezing.   Musculoskeletal:         General: Normal range of motion.      Right elbow: Swelling present. Tenderness present in radial head.      Cervical back: Normal range of motion and neck supple.      Comments: Patient has diffuse erythema, pain on palpation and warmth to the right elbow with fluctuant area on the radial head concerning for bursitis questionable septic bursitis there is a small nick/scab proximally to the erythema most likely the initial infectious source.  Range of motion is intact active and passive flexion and extension.  Pulses 2+ bilaterally.  There is no lymphangitic streaking.   Skin:     Capillary Refill: Capillary refill takes less than 2 seconds.   Neurological:      General: No focal deficit present.      Mental Status: She is alert and oriented to person, place, and time.   Psychiatric:         Mood and Affect: Mood normal.         Behavior: Behavior normal.         Thought Content: Thought content normal.        Procedures    Assessment/Plan   Diagnoses and all orders for this visit:  Olecranon bursitis of right elbow  Patient's room air pulse oximetry was 88% and came up to 93% with deep breathing, scant possible basilar crackles on the left side.  No signs of overt fluid overload that daughter states the patient's normal pulse oximetry is 83%, the right elbow is concerning for bursitis or septic bursitis she does have good range of motion I did offer an Ace wrap, antibiotic therapy and demarcate the area because they stated that her oxygen level was not an issue at this time.  I also did advise it may be feasible to go to emergency department for imaging, IV antibiotics, orthopedic consult with a tap done to that elbow to express prostatic fluid for analysis.  After a very lengthy discussion the family decided to the patient to the emergency room.    Patient disposition: ED

## 2024-07-08 NOTE — TELEPHONE ENCOUNTER
Patients daughter is calling concerned because patient is in need of a refill on oxycodone 5mg .  She takes as PRN and is down to 1 pill.  Send to [facility] pharmacy

## 2024-07-08 NOTE — TELEPHONE ENCOUNTER
Spoke with daughter. Patients right elbow is swollen, red and hot to the touch. I don't see any available appointment slots. Please advise

## 2024-07-08 NOTE — TELEPHONE ENCOUNTER
PT'S DAUGHTER VERBALIZED UNDERSTANDS MESSAGE FROM DR TIJERINA THAT PT NEEDS TO BE SEEN IN URGENT CARE THIS EVENING

## 2024-07-22 DIAGNOSIS — M54.50 LOW BACK PAIN, UNSPECIFIED BACK PAIN LATERALITY, UNSPECIFIED CHRONICITY, UNSPECIFIED WHETHER SCIATICA PRESENT: ICD-10-CM

## 2024-07-22 RX ORDER — OXYCODONE HYDROCHLORIDE 5 MG/1
5 TABLET ORAL EVERY 6 HOURS PRN
Qty: 14 TABLET | Refills: 0 | Status: SHIPPED | OUTPATIENT
Start: 2024-07-22

## 2024-07-23 ENCOUNTER — TELEPHONE (OUTPATIENT)
Dept: PRIMARY CARE | Facility: CLINIC | Age: 84
End: 2024-07-23
Payer: MEDICARE

## 2024-07-23 NOTE — TELEPHONE ENCOUNTER
Spoke with patients daughter Bianca. The patient needs a medication check up. I explained that Dr Lock does not have any soon appts. She was concerned about the pts medication. Is there any sooner apt available? Please advise.

## 2024-08-05 ENCOUNTER — APPOINTMENT (OUTPATIENT)
Dept: PRIMARY CARE | Facility: CLINIC | Age: 84
End: 2024-08-05
Payer: MEDICARE

## 2024-08-05 VITALS
OXYGEN SATURATION: 95 % | HEART RATE: 78 BPM | DIASTOLIC BLOOD PRESSURE: 88 MMHG | TEMPERATURE: 97.9 F | SYSTOLIC BLOOD PRESSURE: 136 MMHG

## 2024-08-05 DIAGNOSIS — I10 BENIGN ESSENTIAL HYPERTENSION: ICD-10-CM

## 2024-08-05 DIAGNOSIS — M54.50 LOW BACK PAIN, UNSPECIFIED BACK PAIN LATERALITY, UNSPECIFIED CHRONICITY, UNSPECIFIED WHETHER SCIATICA PRESENT: ICD-10-CM

## 2024-08-05 DIAGNOSIS — E55.9 VITAMIN D DEFICIENCY: ICD-10-CM

## 2024-08-05 DIAGNOSIS — M25.512 CHRONIC PAIN OF BOTH SHOULDERS: ICD-10-CM

## 2024-08-05 DIAGNOSIS — G89.29 CHRONIC PAIN OF BOTH SHOULDERS: ICD-10-CM

## 2024-08-05 DIAGNOSIS — M70.21 OLECRANON BURSITIS OF RIGHT ELBOW: Primary | ICD-10-CM

## 2024-08-05 DIAGNOSIS — E78.2 MIXED HYPERLIPIDEMIA: ICD-10-CM

## 2024-08-05 DIAGNOSIS — M25.511 CHRONIC PAIN OF BOTH SHOULDERS: ICD-10-CM

## 2024-08-05 PROBLEM — R73.09 ELEVATED GLUCOSE: Status: ACTIVE | Noted: 2024-08-05

## 2024-08-05 PROCEDURE — 1160F RVW MEDS BY RX/DR IN RCRD: CPT | Performed by: INTERNAL MEDICINE

## 2024-08-05 PROCEDURE — 1157F ADVNC CARE PLAN IN RCRD: CPT | Performed by: INTERNAL MEDICINE

## 2024-08-05 PROCEDURE — 3075F SYST BP GE 130 - 139MM HG: CPT | Performed by: INTERNAL MEDICINE

## 2024-08-05 PROCEDURE — 3079F DIAST BP 80-89 MM HG: CPT | Performed by: INTERNAL MEDICINE

## 2024-08-05 PROCEDURE — 99214 OFFICE O/P EST MOD 30 MIN: CPT | Performed by: INTERNAL MEDICINE

## 2024-08-05 PROCEDURE — 1124F ACP DISCUSS-NO DSCNMKR DOCD: CPT | Performed by: INTERNAL MEDICINE

## 2024-08-05 PROCEDURE — 1159F MED LIST DOCD IN RCRD: CPT | Performed by: INTERNAL MEDICINE

## 2024-08-05 RX ORDER — CLINDAMYCIN HYDROCHLORIDE 300 MG/1
600 CAPSULE ORAL 3 TIMES DAILY
COMMUNITY
Start: 2024-07-29 | End: 2024-08-05

## 2024-08-05 RX ORDER — OXYCODONE HYDROCHLORIDE 5 MG/1
5 TABLET ORAL EVERY 6 HOURS PRN
Qty: 30 TABLET | Refills: 0 | Status: SHIPPED | OUTPATIENT
Start: 2024-08-05

## 2024-08-05 ASSESSMENT — PATIENT HEALTH QUESTIONNAIRE - PHQ9
2. FEELING DOWN, DEPRESSED OR HOPELESS: NOT AT ALL
SUM OF ALL RESPONSES TO PHQ9 QUESTIONS 1 AND 2: 0
1. LITTLE INTEREST OR PLEASURE IN DOING THINGS: NOT AT ALL

## 2024-08-05 NOTE — PROGRESS NOTES
Subjective   Patient ID: Sabina Pal is a 83 y.o. female who presents for f/u      HPI   Overall well   Recently with infected olecranon bursitis.  On a second course of antibiotics.  Substantially improved.  Tolerating antibiotics without difficulty.   #1 vocal cord lesion-resolved   #2 shoulder pain- x 2 mths. No trauma.  Increased w/ using walker.    #3 hypothyroidism   #4 elevated creatinine- no issues.  #5 bronchiectasis-  no issues recently.     No falls.  Pain controlled relatively well with current Rx  Bowels ok  Mood good  sleeping    Review of Systems    Objective   /88   Pulse 78   Temp 36.6 °C (97.9 °F)   SpO2 95%     Physical Exam  Alert.  Smiling.  No acute distress.  Lungs clear to auscultation bilaterally.  Heart regular without murmur/rub.    Lab Results   Component Value Date    WBC 9.6 01/29/2024    HGB 14.7 01/29/2024    HCT 48.3 (H) 01/29/2024     01/29/2024    CHOL 219 (H) 12/02/2021    TRIG 154 (H) 12/02/2021    HDL 69.7 12/02/2021    ALT 30 01/29/2024    AST 20 05/28/2022     01/29/2024    K 4.7 01/29/2024     01/29/2024    CREATININE 1.15 (H) 01/29/2024    BUN 21 01/29/2024    CO2 26 01/29/2024    TSH 0.86 01/29/2024    INR 0.9 05/28/2022    HGBA1C 6.1 (H) 01/29/2024 4/25/24 -->cr=0.9    Assessment/Plan   Problem List Items Addressed This Visit             ICD-10-CM    Benign essential hypertension I10    Lumbago M54.50    Relevant Medications    oxyCODONE (Roxicodone) 5 mg immediate release tablet    Vitamin D deficiency E55.9    Bilateral shoulder pain M25.511, M25.512    Mixed hyperlipidemia E78.2     Other Visit Diagnoses         Codes    Olecranon bursitis of right elbow    -  Primary M70.21               #1 vocal cord lesion-resolved, remote issue. Followup ear nose and throat prn  #2 shoulder pain- stable/improved  #3 hypothyroidism-stable followup with endocrinology(dr mascorro).  Check TSH-orders to facility  #4 elevated creatinine- stable/subtle.  suspect she has stage 3 chronic kidney disease. will con't to follow. Order to patient for labs  #5 bronchiectasis- stable on low- dose Advair. Followup pulmonology. con't advair.   #6 osteopenia- stable. Followup rheumatology- con't Rx.  #7 hypertension- good. Continue treatment. Stay focused on low salt diet with exercise  #8 obstructive sleep apnea- refuses treating. Reviewed Ent EVAL.  SHE DECLINES RX.   #9 degenerative joint disease/inflammatory arthritis/sjogren's/sacroiliitis- followup rheumatology. low-dose prednisone.   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 f/u ENT.  #11 anxiety- good, con't. Very supportive family. Encouraged grief group. con't paroxetine to 20 mg. Consider reducing dose next vsit. Reviewed use and side effects  #12 palps- resolved  #13 imbalance- reviewed. Discussed safety issues. . use walker. f/u ENT. con't to use walker.  #14 insomnia- better.   #15 back pain- s/p MRI.  better, appt pending w/ pain med   #16  Dementia-slowly progressing.  Follow-up geriatrics. Reviewed.Family has decided to not use Aricept given concern over QTc prolongation reviewed by pharmacist.  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 No sign of trauma  #20 possible type II MI-status post cardiology evaluation. Patient stress test normal. No recommendations made. I do not have these results. We will try and obtain.  #21 LBP- better.  Still w/ difficulty getting out of a chair.  Working w/ PT.   Con't norco every day.  Reviewed risks.   #22 pna- DOING WELL. f/u  Pulm at [facility] ([place])  #23 candidal esophagitis-resolved  #24 shoulder pain-reviewed.  Consult physical therapy.  Orthopedist if not improving  #25 olecranon bursitis-infected.  Doing very well on antibiotics.  Continue for now.  Reviewed with patient and daughter prognosis and risks of recurrence.    Ella w/ rheum-- osteo/Ra      Follow-up 3 months

## 2024-08-22 ENCOUNTER — OFFICE VISIT (OUTPATIENT)
Dept: RHEUMATOLOGY | Facility: CLINIC | Age: 84
End: 2024-08-22
Payer: MEDICARE

## 2024-08-22 VITALS
HEIGHT: 63 IN | OXYGEN SATURATION: 96 % | HEART RATE: 88 BPM | WEIGHT: 170 LBS | SYSTOLIC BLOOD PRESSURE: 115 MMHG | DIASTOLIC BLOOD PRESSURE: 68 MMHG | BODY MASS INDEX: 30.12 KG/M2

## 2024-08-22 DIAGNOSIS — M35.00 SJOGREN'S SYNDROME, WITH UNSPECIFIED ORGAN INVOLVEMENT (MULTI): ICD-10-CM

## 2024-08-22 DIAGNOSIS — M81.0 AGE RELATED OSTEOPOROSIS, UNSPECIFIED PATHOLOGICAL FRACTURE PRESENCE: ICD-10-CM

## 2024-08-22 DIAGNOSIS — M67.922 DISORDER OF TENDON OF LEFT BICEPS: ICD-10-CM

## 2024-08-22 DIAGNOSIS — M19.011 LOCALIZED OSTEOARTHRITIS OF RIGHT SHOULDER: Primary | ICD-10-CM

## 2024-08-22 PROCEDURE — 3074F SYST BP LT 130 MM HG: CPT | Performed by: INTERNAL MEDICINE

## 2024-08-22 PROCEDURE — 3078F DIAST BP <80 MM HG: CPT | Performed by: INTERNAL MEDICINE

## 2024-08-22 PROCEDURE — 99214 OFFICE O/P EST MOD 30 MIN: CPT | Performed by: INTERNAL MEDICINE

## 2024-08-22 PROCEDURE — 2500000004 HC RX 250 GENERAL PHARMACY W/ HCPCS (ALT 636 FOR OP/ED): Performed by: INTERNAL MEDICINE

## 2024-08-22 PROCEDURE — 1157F ADVNC CARE PLAN IN RCRD: CPT | Performed by: INTERNAL MEDICINE

## 2024-08-22 RX ORDER — TRIAMCINOLONE ACETONIDE 40 MG/ML
40 INJECTION, SUSPENSION INTRA-ARTICULAR; INTRAMUSCULAR
Status: COMPLETED | OUTPATIENT
Start: 2024-08-22 | End: 2024-08-22

## 2024-08-22 RX ORDER — CARBOXYMETHYLCELLULOSE SODIUM 5 MG/ML
1 SOLUTION/ DROPS OPHTHALMIC AS NEEDED
COMMUNITY

## 2024-08-22 ASSESSMENT — ENCOUNTER SYMPTOMS
ABDOMINAL PAIN: 0
FATIGUE: 1
SHORTNESS OF BREATH: 0
SLEEP DISTURBANCE: 0

## 2024-08-22 NOTE — PROGRESS NOTES
Subjective   Patient ID: Sabina Pal is a 83 y.o. female who presents for Follow-up and Shoulder Pain (Right shoulder).  Shoulder Pain       Patient with history of Sjogren's with negative CARMEN/SHARONDA, osteoarthritis, osteoporosis on chronic prednisone.  Has history of left shoulder replacement    Patient is accompanied by her daughter today  We had seen her last May and at that time we had injected her right shoulder.    She is complaining of bilateral shoulder pain.  She has had a fall but has not had any serious injury to this.  Unfortunately had right olecranon bursitis that got infected.  Was given antibiotics initially but then got worse.  Had to have it drained.  Came back as MRSA.  The facility that she has had they tried to wrap it but she tends to take off the padding.  Does have some dementia.      Review of Systems   Constitutional:  Positive for fatigue.   HENT:  Positive for hearing loss.    Respiratory:  Negative for shortness of breath.    Cardiovascular:  Negative for chest pain.   Gastrointestinal:  Negative for abdominal pain.   Musculoskeletal:         Per HPI   Skin:  Negative for rash.   Psychiatric/Behavioral:  Negative for sleep disturbance.        Objective   Physical Exam  GEN: NAD A&O x3 appropriate affect seated in wheelchair  EYES:  no conjunctival redness, eyelids normal  MSK:  Right olecranon bursa fullness  Has tenderness in the biceps muscle on the left  Decreased range of motion bilateral shoulders  Assessment/Plan        Osteoporosis - On chronic prednisone.  Has been on Prolia since August 2020.     -DEXA 11/13/2023 showed T-score left total hip -0.9, left femoral neck -2.3, left forearm total -1.1, has had improvement compared to previous bone density in 2021.   -Will have her continue with Prolia.  She will be due for Prolia injection in 3 months     Osteoarthritis and previous diagnosis of Sjogren's with negative CARMEN/SHARONDA panel.  Has been on hydroxychloroquine.      Right shoulder  pain injected this laterally with 40 mg of Kenalog.  Discussed I am unable to inject into the muscle belly of the left biceps.      Patient ID: Sabina Pal is a 83 y.o. female.    Large Joint Injection/Arthrocentesis on 8/22/2024 11:32 AM  Indications: pain  Details: 25 G needle, lateral approach  Medications: 40 mg triamcinolone acetonide 40 mg/mL  Outcome: tolerated well, no immediate complications  Procedure, treatment alternatives, risks and benefits explained, specific risks discussed. Patient was prepped and draped in the usual sterile fashion.

## 2024-09-03 DIAGNOSIS — M54.50 LOW BACK PAIN, UNSPECIFIED BACK PAIN LATERALITY, UNSPECIFIED CHRONICITY, UNSPECIFIED WHETHER SCIATICA PRESENT: ICD-10-CM

## 2024-09-03 NOTE — TELEPHONE ENCOUNTER
Fax from [facility] requesting refill on patient's Oxycodone. Please advise

## 2024-09-07 RX ORDER — OXYCODONE HYDROCHLORIDE 5 MG/1
5 TABLET ORAL EVERY 6 HOURS PRN
Qty: 30 TABLET | Refills: 0 | Status: SHIPPED | OUTPATIENT
Start: 2024-09-07

## 2024-11-14 ENCOUNTER — APPOINTMENT (OUTPATIENT)
Dept: RHEUMATOLOGY | Facility: CLINIC | Age: 84
End: 2024-11-14
Payer: MEDICARE

## 2024-12-05 ENCOUNTER — APPOINTMENT (OUTPATIENT)
Dept: PRIMARY CARE | Facility: CLINIC | Age: 84
End: 2024-12-05
Payer: MEDICARE